# Patient Record
Sex: FEMALE | Race: WHITE | NOT HISPANIC OR LATINO | Employment: UNEMPLOYED | URBAN - METROPOLITAN AREA
[De-identification: names, ages, dates, MRNs, and addresses within clinical notes are randomized per-mention and may not be internally consistent; named-entity substitution may affect disease eponyms.]

---

## 2023-05-16 LAB
CHLAMYDIA TRACH., AMPLIFIED: NEGATIVE
N. GONORRHEA, AMPLIFIED: NEGATIVE
URINE CULTURE: NO GROWTH

## 2023-09-12 RX ORDER — ACETAMINOPHEN 325 MG/1
TABLET ORAL
COMMUNITY
Start: 2022-04-07 | End: 2023-11-30 | Stop reason: WASHOUT

## 2023-09-12 RX ORDER — PYRIDOXINE HCL (VITAMIN B6) 25 MG
TABLET ORAL
COMMUNITY
End: 2023-10-30 | Stop reason: ALTCHOICE

## 2023-10-20 ENCOUNTER — TELEPHONE (OUTPATIENT)
Dept: OBSTETRICS AND GYNECOLOGY | Facility: CLINIC | Age: 23
End: 2023-10-20

## 2023-10-20 NOTE — TELEPHONE ENCOUNTER
Pt has missed multiple OB appts and said she wants info on what her next appt entails. She missed her glucose, but said that she wasn't aware she needed to do it. She is 30 weeks

## 2023-10-23 DIAGNOSIS — Z3A.30 30 WEEKS GESTATION OF PREGNANCY (HHS-HCC): Primary | ICD-10-CM

## 2023-10-24 PROBLEM — O99.019 ANEMIA AFFECTING PREGNANCY, ANTEPARTUM (HHS-HCC): Status: ACTIVE | Noted: 2023-10-24

## 2023-10-26 PROBLEM — K64.9 HEMORRHOIDS: Status: ACTIVE | Noted: 2023-10-26

## 2023-10-26 PROBLEM — O26.899 PELVIC PAIN IN PREGNANCY (HHS-HCC): Status: ACTIVE | Noted: 2023-10-26

## 2023-10-26 PROBLEM — O21.9 NAUSEA AND VOMITING IN PREGNANCY (HHS-HCC): Status: ACTIVE | Noted: 2023-10-26

## 2023-10-26 PROBLEM — R10.2 PELVIC PAIN IN PREGNANCY (HHS-HCC): Status: ACTIVE | Noted: 2023-10-26

## 2023-10-26 PROBLEM — D64.9 ANEMIA: Status: ACTIVE | Noted: 2023-10-26

## 2023-10-26 PROBLEM — N94.89 UTERINE CRAMPING: Status: ACTIVE | Noted: 2023-10-26

## 2023-10-26 PROBLEM — N91.2 AMENORRHEA: Status: ACTIVE | Noted: 2023-10-26

## 2023-10-30 ENCOUNTER — ROUTINE PRENATAL (OUTPATIENT)
Dept: OBSTETRICS AND GYNECOLOGY | Facility: CLINIC | Age: 23
End: 2023-10-30
Payer: MEDICAID

## 2023-10-30 VITALS — SYSTOLIC BLOOD PRESSURE: 118 MMHG | WEIGHT: 177 LBS | DIASTOLIC BLOOD PRESSURE: 78 MMHG | BODY MASS INDEX: 29.45 KG/M2

## 2023-10-30 DIAGNOSIS — Z34.83 NORMAL PREGNANCY IN MULTIGRAVIDA IN THIRD TRIMESTER (HHS-HCC): ICD-10-CM

## 2023-10-30 DIAGNOSIS — Z3A.31 31 WEEKS GESTATION OF PREGNANCY (HHS-HCC): Primary | ICD-10-CM

## 2023-10-30 PROCEDURE — 99213 OFFICE O/P EST LOW 20 MIN: CPT | Performed by: ADVANCED PRACTICE MIDWIFE

## 2023-10-30 NOTE — PROGRESS NOTES
Return OB visit    S: Myrtle Colon is a 22 y.o.  at 31w1d with a working estimated date of delivery of 2023, by Ultrasound who presents for a routine prenatal visit. She denies vaginal bleeding, abdominal pain, leakage of fluid.     Concerns today: Patient has no concerns.     Kathy Patterson MA    O: See prenatal flow sheet    A/P:  -Labs for CBC/GCT discussed, pt was unable to complete prior to today's visit but plans to complete with next visit. Denies hx GDM with pregnancies. Taking PNV.  -Discussed vaccines in pregnancy including flu, covid, rsv, and tdap; pt declines vaccines.  -Reviewed warning signs and when to call midwife  -Plans to follow up in 2 weeks for a routine prenatal visit    Review labor preferences and provide form for completion.

## 2023-10-31 PROBLEM — N94.89 UTERINE CRAMPING: Status: RESOLVED | Noted: 2023-10-26 | Resolved: 2023-10-31

## 2023-10-31 PROBLEM — N91.2 AMENORRHEA: Status: RESOLVED | Noted: 2023-10-26 | Resolved: 2023-10-31

## 2023-11-03 NOTE — TELEPHONE ENCOUNTER
Patient calling back regarding the appointments that she has missed and wanted to know if she could come in and do her glucose test.

## 2023-11-15 ENCOUNTER — LAB (OUTPATIENT)
Dept: LAB | Facility: LAB | Age: 23
End: 2023-11-15
Payer: MEDICAID

## 2023-11-15 ENCOUNTER — APPOINTMENT (OUTPATIENT)
Dept: OBSTETRICS AND GYNECOLOGY | Facility: CLINIC | Age: 23
End: 2023-11-15
Payer: MEDICAID

## 2023-11-15 DIAGNOSIS — Z3A.30 30 WEEKS GESTATION OF PREGNANCY (HHS-HCC): ICD-10-CM

## 2023-11-15 LAB
ABO GROUP (TYPE) IN BLOOD: NORMAL
ANTIBODY SCREEN: NORMAL
ERYTHROCYTE [DISTWIDTH] IN BLOOD BY AUTOMATED COUNT: 12.5 % (ref 11.5–14.5)
GLUCOSE 1H P 50 G GLC PO SERPL-MCNC: 90 MG/DL
HBV SURFACE AG SERPL QL IA: NONREACTIVE
HCT VFR BLD AUTO: 35.4 % (ref 36–46)
HCV AB SER QL: NONREACTIVE
HGB BLD-MCNC: 11.6 G/DL (ref 12–16)
MCH RBC QN AUTO: 29.2 PG (ref 26–34)
MCHC RBC AUTO-ENTMCNC: 32.8 G/DL (ref 32–36)
MCV RBC AUTO: 89 FL (ref 80–100)
NRBC BLD-RTO: 0 /100 WBCS (ref 0–0)
PLATELET # BLD AUTO: 184 X10*3/UL (ref 150–450)
RBC # BLD AUTO: 3.97 X10*6/UL (ref 4–5.2)
RH FACTOR (ANTIGEN D): NORMAL
WBC # BLD AUTO: 7.6 X10*3/UL (ref 4.4–11.3)

## 2023-11-15 PROCEDURE — 87340 HEPATITIS B SURFACE AG IA: CPT

## 2023-11-15 PROCEDURE — 86901 BLOOD TYPING SEROLOGIC RH(D): CPT

## 2023-11-15 PROCEDURE — 86803 HEPATITIS C AB TEST: CPT

## 2023-11-15 PROCEDURE — 86780 TREPONEMA PALLIDUM: CPT

## 2023-11-15 PROCEDURE — 86850 RBC ANTIBODY SCREEN: CPT

## 2023-11-15 PROCEDURE — 86317 IMMUNOASSAY INFECTIOUS AGENT: CPT

## 2023-11-15 PROCEDURE — 85027 COMPLETE CBC AUTOMATED: CPT

## 2023-11-15 PROCEDURE — 86900 BLOOD TYPING SEROLOGIC ABO: CPT

## 2023-11-15 PROCEDURE — 82947 ASSAY GLUCOSE BLOOD QUANT: CPT

## 2023-11-15 PROCEDURE — 36415 COLL VENOUS BLD VENIPUNCTURE: CPT

## 2023-11-16 LAB
REFLEX ADDED, ANEMIA PANEL: NORMAL
RUBV IGG SERPL IA-ACNC: 3.7 IA
RUBV IGG SERPL QL IA: POSITIVE
T PALLIDUM AB SER QL: NONREACTIVE

## 2023-11-30 ENCOUNTER — ROUTINE PRENATAL (OUTPATIENT)
Dept: OBSTETRICS AND GYNECOLOGY | Facility: CLINIC | Age: 23
End: 2023-11-30

## 2023-11-30 VITALS — SYSTOLIC BLOOD PRESSURE: 130 MMHG | WEIGHT: 181.4 LBS | BODY MASS INDEX: 30.19 KG/M2 | DIASTOLIC BLOOD PRESSURE: 78 MMHG

## 2023-11-30 DIAGNOSIS — O99.019 ANEMIA AFFECTING PREGNANCY, ANTEPARTUM (HHS-HCC): ICD-10-CM

## 2023-11-30 DIAGNOSIS — R10.2 PELVIC PAIN IN PREGNANCY (HHS-HCC): ICD-10-CM

## 2023-11-30 DIAGNOSIS — O21.9 NAUSEA AND VOMITING IN PREGNANCY (HHS-HCC): ICD-10-CM

## 2023-11-30 DIAGNOSIS — O26.899 PELVIC PAIN IN PREGNANCY (HHS-HCC): ICD-10-CM

## 2023-11-30 PROCEDURE — 0501F PRENATAL FLOW SHEET: CPT | Performed by: ADVANCED PRACTICE MIDWIFE

## 2023-11-30 NOTE — PROGRESS NOTES
Subjective     Myrtle Colon is a 23 y.o.  at 35w4d with a working estimated date of delivery of 2023, by Ultrasound who presents for a routine prenatal visit. Endorses good fetal movement, denies vaginal bleeding, leakage of fluid, or regular contractions.    No OB concerns    Her pregnancy is complicated by:  Pregnancy Problems (from 05/15/23 to present)       Problem Noted Resolved    Pelvic pain in pregnancy 10/26/2023 by Wagner Ronaldo No    Priority:  Medium      Nausea and vomiting in pregnancy 10/26/2023 by Wagner Higgins No    Priority:  Medium      Anemia affecting pregnancy, antepartum 10/24/2023 by MIGUEL ÁNGEL Chin No    Priority:  Medium               Objective   Physical Exam:   Weight: 82.3 kg (181 lb 6.4 oz)  TW.1 kg (24 lb 6.4 oz)  Expected Total Weight Gain: 7 kg (15 lb)-11.5 kg (25 lb)   Pregravid BMI: 26.13  BP: 130/78  Fetal Heart Rate: 125 Fundal Height (cm): 35 cm             Postpartum Depression: Not on file        Prenatal Labs  Lab Results   Component Value Date    HGB 11.6 (L) 11/15/2023    HCT 35.4 (L) 11/15/2023     11/15/2023    ABO A 11/15/2023    LABRH POS 11/15/2023    NEISSGONOAMP NEGATIVE 05/15/2023    CHLAMTRACAMP NEGATIVE 05/15/2023    SYPHT Nonreactive 11/15/2023    HEPBSAG Nonreactive 11/15/2023    HIV1X2 NONREACTIVE 2021    URINECULTURE NO GROWTH 05/15/2023     Lab Results   Component Value Date    GLUC1P 90 11/15/2023       Assessment/Plan   23 y.o.  at 35w4d  Continue prenatal vitamins  GBS cx at next visit    Reviewed s/sx of PTL, warning signs, fetal movement counts, and when to call provider    Follow up in 2 week(s) for a routine prenatal visit or sooner as needed.    MIGUEL ÁNGEL Steen

## 2023-12-11 ENCOUNTER — ROUTINE PRENATAL (OUTPATIENT)
Dept: OBSTETRICS AND GYNECOLOGY | Facility: CLINIC | Age: 23
End: 2023-12-11
Payer: MEDICAID

## 2023-12-11 VITALS — DIASTOLIC BLOOD PRESSURE: 74 MMHG | BODY MASS INDEX: 30.12 KG/M2 | WEIGHT: 181 LBS | SYSTOLIC BLOOD PRESSURE: 116 MMHG

## 2023-12-11 DIAGNOSIS — Z3A.37 37 WEEKS GESTATION OF PREGNANCY (HHS-HCC): ICD-10-CM

## 2023-12-11 DIAGNOSIS — O21.9 NAUSEA AND VOMITING IN PREGNANCY (HHS-HCC): ICD-10-CM

## 2023-12-11 DIAGNOSIS — O26.899 PELVIC PAIN IN PREGNANCY (HHS-HCC): ICD-10-CM

## 2023-12-11 DIAGNOSIS — O99.019 ANEMIA AFFECTING PREGNANCY, ANTEPARTUM (HHS-HCC): ICD-10-CM

## 2023-12-11 DIAGNOSIS — R10.2 PELVIC PAIN IN PREGNANCY (HHS-HCC): ICD-10-CM

## 2023-12-11 PROCEDURE — 87081 CULTURE SCREEN ONLY: CPT

## 2023-12-11 PROCEDURE — 99213 OFFICE O/P EST LOW 20 MIN: CPT | Performed by: ADVANCED PRACTICE MIDWIFE

## 2023-12-11 NOTE — PROGRESS NOTES
"Subjective     Myrtle Colon is a 23 y.o.  at 37w1d with a working estimated date of delivery of 2023, by Ultrasound who presents for a routine prenatal visit. Endorses good fetal movement, denies vaginal bleeding, leakage of fluid, or regular contractions.    No OB concerns today.    Her pregnancy is complicated by:  Pregnancy Problems (from 05/15/23 to present)       Problem Noted Resolved    Pelvic pain in pregnancy 10/26/2023 by Wagner Ronaldo No    Priority:  Medium      Nausea and vomiting in pregnancy 10/26/2023 by Wagner Higgins No    Priority:  Medium      Anemia affecting pregnancy, antepartum 10/24/2023 by MIGUEL ÁNGEL Chin No    Priority:  Medium         Objective   Physical Exam:   Weight: 82.1 kg (181 lb)  TWG: 10.9 kg (24 lb)  Expected Total Weight Gain: 7 kg (15 lb)-11.5 kg (25 lb)   Pregravid BMI: 26.13  BP: 116/74  Fetal Heart Rate: 115 Fundal Height (cm): 38 cm    Dilation: 1 Effacement (%): 70 Fetal Station: -2 (ballotable)    Postpartum Depression: Not on file        Prenatal Labs  Lab Results   Component Value Date    HGB 11.6 (L) 11/15/2023    HCT 35.4 (L) 11/15/2023     11/15/2023    ABO A 11/15/2023    LABRH POS 11/15/2023    NEISSGONOAMP NEGATIVE 05/15/2023    CHLAMTRACAMP NEGATIVE 05/15/2023    SYPHT Nonreactive 11/15/2023    HEPBSAG Nonreactive 11/15/2023    HIV1X2 NONREACTIVE 2021    URINECULTURE NO GROWTH 05/15/2023     Lab Results   Component Value Date    GLUC1P 90 11/15/2023     No results found for: \"GRPBSTREP\"     Assessment/Plan   23 y.o.  at 37w1d  Continue prenatal vitamins  GBS collected, would prefer to avoid PCN. Explained the risk to  if transmitted during labor, including meningitis, sepsis, even death.  Encouraged pt to look into GBS infection in newborns before declining.  Labor consent reviewed and signed     Follow up in 1 week(s) for a routine prenatal visit or sooner as needed.    MIGUEL ÁNGEL Steen  "

## 2023-12-14 LAB — GP B STREP GENITAL QL CULT: NORMAL

## 2023-12-20 ENCOUNTER — APPOINTMENT (OUTPATIENT)
Dept: OBSTETRICS AND GYNECOLOGY | Facility: CLINIC | Age: 23
End: 2023-12-20
Payer: MEDICAID

## 2023-12-27 ENCOUNTER — ROUTINE PRENATAL (OUTPATIENT)
Dept: OBSTETRICS AND GYNECOLOGY | Facility: CLINIC | Age: 23
End: 2023-12-27
Payer: MEDICAID

## 2023-12-27 ENCOUNTER — APPOINTMENT (OUTPATIENT)
Dept: OBSTETRICS AND GYNECOLOGY | Facility: CLINIC | Age: 23
End: 2023-12-27
Payer: MEDICAID

## 2023-12-27 VITALS — SYSTOLIC BLOOD PRESSURE: 120 MMHG | WEIGHT: 183.6 LBS | BODY MASS INDEX: 30.55 KG/M2 | DIASTOLIC BLOOD PRESSURE: 80 MMHG

## 2023-12-27 DIAGNOSIS — Z3A.39 39 WEEKS GESTATION OF PREGNANCY (HHS-HCC): ICD-10-CM

## 2023-12-27 DIAGNOSIS — O99.019 ANEMIA AFFECTING PREGNANCY, ANTEPARTUM (HHS-HCC): ICD-10-CM

## 2023-12-27 DIAGNOSIS — O21.9 NAUSEA AND VOMITING IN PREGNANCY (HHS-HCC): ICD-10-CM

## 2023-12-27 DIAGNOSIS — R10.2 PELVIC PAIN IN PREGNANCY (HHS-HCC): ICD-10-CM

## 2023-12-27 DIAGNOSIS — O26.899 PELVIC PAIN IN PREGNANCY (HHS-HCC): ICD-10-CM

## 2023-12-27 LAB — HIV 1+2 AB+HIV1 P24 AG SERPL QL IA: NONREACTIVE

## 2023-12-27 PROCEDURE — 99213 OFFICE O/P EST LOW 20 MIN: CPT

## 2023-12-27 PROCEDURE — 87389 HIV-1 AG W/HIV-1&-2 AB AG IA: CPT

## 2023-12-27 PROCEDURE — 36415 COLL VENOUS BLD VENIPUNCTURE: CPT

## 2023-12-27 NOTE — PROGRESS NOTES
Bloody show for 3 days and more contractions, not strong enough to time  Pt declined being checked for dilation today 120/80    Subjective     Myrtle Colon is a 23 y.o.  at 39w3d with a working estimated date of delivery of 2023, by Ultrasound who presents for a routine prenatal visit. She denies leakage of fluid, decreased fetal movements, or regular contractions, though notes some brown tinged bloody discharge over the last three days.     HIV omitted in error from routine prenatal labs. Will collect today.     Discussed recommended vaccines in pregnancy and patient declines all vaccines.     Patient desires NCB and HBC. Patient desires to await spontaneous labor.     Her pregnancy is complicated by:  Medical Problems       Problem List       Anemia affecting pregnancy, antepartum    Pelvic pain in pregnancy    Nausea and vomiting in pregnancy    Hemorrhoids          Objective   Weight: 83.3 kg (183 lb 9.6 oz)  TW.1 kg (26 lb 9.6 oz)  Pregravid BMI: 26.13    BP: 120/80          Prenatal Labs:  Recent Results (from the past 4032 hour(s))   Hepatitis C Antibody    Collection Time: 11/15/23 10:19 AM   Result Value Ref Range    Hepatitis C AB Nonreactive Nonreactive   Hepatitis B Surface Antigen    Collection Time: 11/15/23 10:19 AM   Result Value Ref Range    Hepatitis B Surface AG Nonreactive Nonreactive   Rubella Antibody, IgG    Collection Time: 11/15/23 10:19 AM   Result Value Ref Range    Rubella, IgG Positive Negative    Rubella, IgG Index 3.7 <=0.7 IA IA   Syphilis Screen with Reflex    Collection Time: 11/15/23 10:19 AM   Result Value Ref Range    Syphilis Total Ab Nonreactive Nonreactive   Type And Screen    Collection Time: 11/15/23 10:19 AM   Result Value Ref Range    ABO TYPE A     Rh TYPE POS     ANTIBODY SCREEN NEG    Glucose, 1 Hour Screen, Pregnancy    Collection Time: 11/15/23 10:19 AM   Result Value Ref Range    Glucose, 1 Hr Screen, Preg 90 <135 mg/dL   CBC    Collection  Time: 11/15/23 10:19 AM   Result Value Ref Range    WBC 7.6 4.4 - 11.3 x10*3/uL    nRBC 0.0 0.0 - 0.0 /100 WBCs    RBC 3.97 (L) 4.00 - 5.20 x10*6/uL    Hemoglobin 11.6 (L) 12.0 - 16.0 g/dL    Hematocrit 35.4 (L) 36.0 - 46.0 %    MCV 89 80 - 100 fL    MCH 29.2 26.0 - 34.0 pg    MCHC 32.8 32.0 - 36.0 g/dL    RDW 12.5 11.5 - 14.5 %    Platelets 184 150 - 450 x10*3/uL   CBC Anemia Panel with Reflex, Pregnancy    Collection Time: 11/15/23 10:19 AM   Result Value Ref Range    Reflex added, Anemia panel     Group B Streptococcus (GBS) Prenatal Screen, Culture    Collection Time: 12/11/23  2:24 PM    Specimen: Vaginal/Rectal; Swab   Result Value Ref Range    Group B Strep Screen No Group B Streptococcus (GBS) isolated         Assessment/Plan     HIV collected today as it was previously omitted in error  Reviewed s/sx of labor, warning signs, fetal movement counts, and when to call provider  Follow up in 1 week for a routine prenatal visit.    Criss Tran, CHEVY-LETY

## 2024-01-03 ENCOUNTER — ROUTINE PRENATAL (OUTPATIENT)
Dept: OBSTETRICS AND GYNECOLOGY | Facility: CLINIC | Age: 24
End: 2024-01-03
Payer: MEDICAID

## 2024-01-03 VITALS — DIASTOLIC BLOOD PRESSURE: 86 MMHG | WEIGHT: 185 LBS | SYSTOLIC BLOOD PRESSURE: 116 MMHG | BODY MASS INDEX: 30.79 KG/M2

## 2024-01-03 DIAGNOSIS — Z34.83 NORMAL PREGNANCY IN MULTIGRAVIDA IN THIRD TRIMESTER (HHS-HCC): ICD-10-CM

## 2024-01-03 DIAGNOSIS — Z3A.40 40 WEEKS GESTATION OF PREGNANCY (HHS-HCC): Primary | ICD-10-CM

## 2024-01-03 PROCEDURE — 99213 OFFICE O/P EST LOW 20 MIN: CPT | Performed by: ADVANCED PRACTICE MIDWIFE

## 2024-01-03 NOTE — PROGRESS NOTES
Return OB visit    S: Myrtle Colon is a 23 y.o.  at 40w3d with a working estimated date of delivery of 2023, by Ultrasound who presents for a routine prenatal visit. She denies vaginal bleeding, abdominal pain, leakage of fluid.     Concerns today: Patient has no concerns. Had one night of contractions that have stopped.    Kathy Patterson MA    O: See prenatal flow sheet    A/P:  Advised at 41 weeks to have BPP twice weekly, pt to call now to schedule  Discussed and desires continued expectant management  Reviewed warning signs and when to call midwife  Follow up in 1 weeks for a routine prenatal visit

## 2024-01-08 ENCOUNTER — TELEPHONE (OUTPATIENT)
Dept: OBSTETRICS AND GYNECOLOGY | Facility: HOSPITAL | Age: 24
End: 2024-01-08

## 2024-01-08 ENCOUNTER — TELEPHONE (OUTPATIENT)
Dept: OBSTETRICS AND GYNECOLOGY | Facility: CLINIC | Age: 24
End: 2024-01-08

## 2024-01-08 NOTE — TELEPHONE ENCOUNTER
41.1 wk ob called ob line c/o irregular contractions since last night, becoming more painful having to breath through them.  Denies LOF but has been having brown spotting for the last week.  + FM.  Patient had consistent contractions every 6 minutes 2 nights ago but they subsided, yesterday they were very irregular as well.  Patient is very concerned with the sharp abdominal pain, below her belly button.  At her last ob follow up she was 2cm dilated.      Patient advised to come in today to see a CNM for ob follow up, since she is 41.1 wks without a scheduled appointment.  Patient advised that she will see Madelaine and may potentially get an NST.  Patient agreed    Message forwarded to Madelaine making her aware

## 2024-01-08 NOTE — TELEPHONE ENCOUNTER
"ITelephone call to patient:  I recommended patient come to the hospital and consider being delivered. Patient declined recommendations stating \" I am just overthinking this, the baby is probably posterior\".  Previous pregnancies both 10 days late.  Desires low intervention and to utilize the birth center. Reports + fm and irregular ctx, denies bleeding or lof.  Offered an appointment in the office patient declined. Also has not scheduled 2 x weekly monitoring for BPP and NST.  "

## 2024-01-09 ENCOUNTER — ROUTINE PRENATAL (OUTPATIENT)
Dept: OBSTETRICS AND GYNECOLOGY | Facility: CLINIC | Age: 24
End: 2024-01-09
Payer: MEDICAID

## 2024-01-09 ENCOUNTER — HOSPITAL ENCOUNTER (INPATIENT)
Facility: HOSPITAL | Age: 24
LOS: 1 days | Discharge: HOME | End: 2024-01-10
Attending: OBSTETRICS & GYNECOLOGY | Admitting: ADVANCED PRACTICE MIDWIFE
Payer: MEDICAID

## 2024-01-09 VITALS
BODY MASS INDEX: 30.66 KG/M2 | DIASTOLIC BLOOD PRESSURE: 82 MMHG | SYSTOLIC BLOOD PRESSURE: 128 MMHG | WEIGHT: 184.25 LBS

## 2024-01-09 DIAGNOSIS — K59.00 CONSTIPATION, UNSPECIFIED CONSTIPATION TYPE: Primary | ICD-10-CM

## 2024-01-09 DIAGNOSIS — R52 POSTPARTUM PAIN (HHS-HCC): ICD-10-CM

## 2024-01-09 DIAGNOSIS — O48.0 41 WEEKS GESTATION OF PREGNANCY (HHS-HCC): Primary | ICD-10-CM

## 2024-01-09 DIAGNOSIS — Z36.89 NST (NON-STRESS TEST) REACTIVE (HHS-HCC): ICD-10-CM

## 2024-01-09 DIAGNOSIS — Z3A.41 41 WEEKS GESTATION OF PREGNANCY (HHS-HCC): Primary | ICD-10-CM

## 2024-01-09 PROBLEM — O26.899 PELVIC PAIN IN PREGNANCY (HHS-HCC): Status: RESOLVED | Noted: 2023-10-26 | Resolved: 2024-01-09

## 2024-01-09 PROBLEM — K64.9 HEMORRHOIDS: Status: RESOLVED | Noted: 2023-10-26 | Resolved: 2024-01-09

## 2024-01-09 PROBLEM — R10.2 PELVIC PAIN IN PREGNANCY (HHS-HCC): Status: RESOLVED | Noted: 2023-10-26 | Resolved: 2024-01-09

## 2024-01-09 PROBLEM — O21.9 NAUSEA AND VOMITING IN PREGNANCY (HHS-HCC): Status: RESOLVED | Noted: 2023-10-26 | Resolved: 2024-01-09

## 2024-01-09 PROBLEM — O99.019 ANEMIA AFFECTING PREGNANCY, ANTEPARTUM (HHS-HCC): Status: RESOLVED | Noted: 2023-10-24 | Resolved: 2024-01-09

## 2024-01-09 LAB
ABO GROUP (TYPE) IN BLOOD: NORMAL
ANTIBODY SCREEN: NORMAL
ERYTHROCYTE [DISTWIDTH] IN BLOOD BY AUTOMATED COUNT: 13.1 % (ref 11.5–14.5)
HCT VFR BLD AUTO: 36.7 % (ref 36–46)
HGB BLD-MCNC: 11.7 G/DL (ref 12–16)
HOLD SPECIMEN: NORMAL
MCH RBC QN AUTO: 28.3 PG (ref 26–34)
MCHC RBC AUTO-ENTMCNC: 31.9 G/DL (ref 32–36)
MCV RBC AUTO: 89 FL (ref 80–100)
NRBC BLD-RTO: 0 /100 WBCS (ref 0–0)
PLATELET # BLD AUTO: 198 X10*3/UL (ref 150–450)
RBC # BLD AUTO: 4.14 X10*6/UL (ref 4–5.2)
RH FACTOR (ANTIGEN D): NORMAL
WBC # BLD AUTO: 16.7 X10*3/UL (ref 4.4–11.3)

## 2024-01-09 PROCEDURE — 86901 BLOOD TYPING SEROLOGIC RH(D): CPT | Performed by: ADVANCED PRACTICE MIDWIFE

## 2024-01-09 PROCEDURE — 99213 OFFICE O/P EST LOW 20 MIN: CPT | Performed by: ADVANCED PRACTICE MIDWIFE

## 2024-01-09 PROCEDURE — 7210000002 HC LABOR PER HOUR

## 2024-01-09 PROCEDURE — 59409 OBSTETRICAL CARE: CPT | Performed by: ADVANCED PRACTICE MIDWIFE

## 2024-01-09 PROCEDURE — 1120000001 HC OB PRIVATE ROOM DAILY

## 2024-01-09 PROCEDURE — 59050 FETAL MONITOR W/REPORT: CPT

## 2024-01-09 PROCEDURE — 2500000001 HC RX 250 WO HCPCS SELF ADMINISTERED DRUGS (ALT 637 FOR MEDICARE OP): Performed by: ADVANCED PRACTICE MIDWIFE

## 2024-01-09 PROCEDURE — 59025 FETAL NON-STRESS TEST: CPT | Performed by: ADVANCED PRACTICE MIDWIFE

## 2024-01-09 PROCEDURE — 7100000016 HC LABOR RECOVERY PER HOUR

## 2024-01-09 PROCEDURE — 86780 TREPONEMA PALLIDUM: CPT | Mod: STJLAB | Performed by: ADVANCED PRACTICE MIDWIFE

## 2024-01-09 PROCEDURE — 85027 COMPLETE CBC AUTOMATED: CPT | Performed by: ADVANCED PRACTICE MIDWIFE

## 2024-01-09 PROCEDURE — 36415 COLL VENOUS BLD VENIPUNCTURE: CPT | Performed by: ADVANCED PRACTICE MIDWIFE

## 2024-01-09 RX ORDER — LIDOCAINE 560 MG/1
1 PATCH PERCUTANEOUS; TOPICAL; TRANSDERMAL
Status: DISCONTINUED | OUTPATIENT
Start: 2024-01-09 | End: 2024-01-10 | Stop reason: HOSPADM

## 2024-01-09 RX ORDER — TRANEXAMIC ACID 100 MG/ML
1000 INJECTION, SOLUTION INTRAVENOUS ONCE AS NEEDED
Status: DISCONTINUED | OUTPATIENT
Start: 2024-01-09 | End: 2024-01-10 | Stop reason: HOSPADM

## 2024-01-09 RX ORDER — CARBOPROST TROMETHAMINE 250 UG/ML
250 INJECTION, SOLUTION INTRAMUSCULAR ONCE AS NEEDED
Status: DISCONTINUED | OUTPATIENT
Start: 2024-01-09 | End: 2024-01-10 | Stop reason: HOSPADM

## 2024-01-09 RX ORDER — IBUPROFEN 600 MG/1
600 TABLET ORAL EVERY 6 HOURS PRN
Qty: 120 TABLET | Refills: 0 | Status: SHIPPED | OUTPATIENT
Start: 2024-01-09

## 2024-01-09 RX ORDER — NIFEDIPINE 10 MG/1
10 CAPSULE ORAL ONCE AS NEEDED
Status: DISCONTINUED | OUTPATIENT
Start: 2024-01-09 | End: 2024-01-10 | Stop reason: HOSPADM

## 2024-01-09 RX ORDER — NIFEDIPINE 10 MG/1
10 CAPSULE ORAL ONCE AS NEEDED
Status: DISCONTINUED | OUTPATIENT
Start: 2024-01-09 | End: 2024-01-09

## 2024-01-09 RX ORDER — LABETALOL HYDROCHLORIDE 5 MG/ML
20 INJECTION, SOLUTION INTRAVENOUS ONCE AS NEEDED
Status: DISCONTINUED | OUTPATIENT
Start: 2024-01-09 | End: 2024-01-09

## 2024-01-09 RX ORDER — SIMETHICONE 80 MG
80 TABLET,CHEWABLE ORAL 4 TIMES DAILY PRN
Status: DISCONTINUED | OUTPATIENT
Start: 2024-01-09 | End: 2024-01-10 | Stop reason: HOSPADM

## 2024-01-09 RX ORDER — OXYTOCIN 10 [USP'U]/ML
10 INJECTION, SOLUTION INTRAMUSCULAR; INTRAVENOUS ONCE AS NEEDED
Status: DISCONTINUED | OUTPATIENT
Start: 2024-01-09 | End: 2024-01-09

## 2024-01-09 RX ORDER — MAGNESIUM HYDROXIDE 2400 MG/10ML
10 SUSPENSION ORAL
Status: DISCONTINUED | OUTPATIENT
Start: 2024-01-09 | End: 2024-01-10 | Stop reason: HOSPADM

## 2024-01-09 RX ORDER — MISOPROSTOL 200 UG/1
800 TABLET ORAL ONCE AS NEEDED
Status: DISCONTINUED | OUTPATIENT
Start: 2024-01-09 | End: 2024-01-09

## 2024-01-09 RX ORDER — LOPERAMIDE HYDROCHLORIDE 2 MG/1
4 CAPSULE ORAL EVERY 2 HOUR PRN
Status: DISCONTINUED | OUTPATIENT
Start: 2024-01-09 | End: 2024-01-10 | Stop reason: HOSPADM

## 2024-01-09 RX ORDER — LOPERAMIDE HYDROCHLORIDE 2 MG/1
4 CAPSULE ORAL EVERY 2 HOUR PRN
Status: DISCONTINUED | OUTPATIENT
Start: 2024-01-09 | End: 2024-01-09

## 2024-01-09 RX ORDER — MINERAL OIL
OIL (ML) ORAL
Status: DISPENSED
Start: 2024-01-09 | End: 2024-01-10

## 2024-01-09 RX ORDER — MISOPROSTOL 200 UG/1
800 TABLET ORAL ONCE AS NEEDED
Status: DISCONTINUED | OUTPATIENT
Start: 2024-01-09 | End: 2024-01-10 | Stop reason: HOSPADM

## 2024-01-09 RX ORDER — OXYTOCIN/0.9 % SODIUM CHLORIDE 30/500 ML
PLASTIC BAG, INJECTION (ML) INTRAVENOUS
Status: DISCONTINUED
Start: 2024-01-09 | End: 2024-01-09 | Stop reason: WASHOUT

## 2024-01-09 RX ORDER — IBUPROFEN 600 MG/1
600 TABLET ORAL EVERY 6 HOURS SCHEDULED
Status: DISCONTINUED | OUTPATIENT
Start: 2024-01-09 | End: 2024-01-10 | Stop reason: HOSPADM

## 2024-01-09 RX ORDER — HYDRALAZINE HYDROCHLORIDE 20 MG/ML
5 INJECTION INTRAMUSCULAR; INTRAVENOUS ONCE AS NEEDED
Status: DISCONTINUED | OUTPATIENT
Start: 2024-01-09 | End: 2024-01-09

## 2024-01-09 RX ORDER — OXYTOCIN 10 [USP'U]/ML
10 INJECTION, SOLUTION INTRAMUSCULAR; INTRAVENOUS ONCE AS NEEDED
Status: DISCONTINUED | OUTPATIENT
Start: 2024-01-09 | End: 2024-01-10 | Stop reason: HOSPADM

## 2024-01-09 RX ORDER — METOCLOPRAMIDE 10 MG/1
10 TABLET ORAL EVERY 6 HOURS PRN
Status: DISCONTINUED | OUTPATIENT
Start: 2024-01-09 | End: 2024-01-09

## 2024-01-09 RX ORDER — CARBOPROST TROMETHAMINE 250 UG/ML
250 INJECTION, SOLUTION INTRAMUSCULAR ONCE AS NEEDED
Status: DISCONTINUED | OUTPATIENT
Start: 2024-01-09 | End: 2024-01-09

## 2024-01-09 RX ORDER — BISACODYL 10 MG/1
10 SUPPOSITORY RECTAL DAILY PRN
Status: DISCONTINUED | OUTPATIENT
Start: 2024-01-09 | End: 2024-01-10 | Stop reason: HOSPADM

## 2024-01-09 RX ORDER — METOCLOPRAMIDE HYDROCHLORIDE 5 MG/ML
10 INJECTION INTRAMUSCULAR; INTRAVENOUS EVERY 6 HOURS PRN
Status: DISCONTINUED | OUTPATIENT
Start: 2024-01-09 | End: 2024-01-09

## 2024-01-09 RX ORDER — TERBUTALINE SULFATE 1 MG/ML
0.25 INJECTION SUBCUTANEOUS ONCE AS NEEDED
Status: DISCONTINUED | OUTPATIENT
Start: 2024-01-09 | End: 2024-01-09

## 2024-01-09 RX ORDER — LABETALOL HYDROCHLORIDE 5 MG/ML
20 INJECTION, SOLUTION INTRAVENOUS ONCE AS NEEDED
Status: DISCONTINUED | OUTPATIENT
Start: 2024-01-09 | End: 2024-01-10 | Stop reason: HOSPADM

## 2024-01-09 RX ORDER — METHYLERGONOVINE MALEATE 0.2 MG/ML
0.2 INJECTION INTRAVENOUS ONCE AS NEEDED
Status: DISCONTINUED | OUTPATIENT
Start: 2024-01-09 | End: 2024-01-09

## 2024-01-09 RX ORDER — ACETAMINOPHEN 500 MG
1000 TABLET ORAL EVERY 6 HOURS PRN
COMMUNITY
Start: 2024-01-09

## 2024-01-09 RX ORDER — DIPHENHYDRAMINE HYDROCHLORIDE 50 MG/ML
25 INJECTION INTRAMUSCULAR; INTRAVENOUS EVERY 6 HOURS PRN
Status: DISCONTINUED | OUTPATIENT
Start: 2024-01-09 | End: 2024-01-10 | Stop reason: HOSPADM

## 2024-01-09 RX ORDER — ONDANSETRON 4 MG/1
4 TABLET, FILM COATED ORAL EVERY 6 HOURS PRN
Status: DISCONTINUED | OUTPATIENT
Start: 2024-01-09 | End: 2024-01-09

## 2024-01-09 RX ORDER — ACETAMINOPHEN 325 MG/1
975 TABLET ORAL EVERY 6 HOURS SCHEDULED
Status: DISCONTINUED | OUTPATIENT
Start: 2024-01-09 | End: 2024-01-10 | Stop reason: HOSPADM

## 2024-01-09 RX ORDER — HYDRALAZINE HYDROCHLORIDE 20 MG/ML
5 INJECTION INTRAMUSCULAR; INTRAVENOUS ONCE AS NEEDED
Status: DISCONTINUED | OUTPATIENT
Start: 2024-01-09 | End: 2024-01-10 | Stop reason: HOSPADM

## 2024-01-09 RX ORDER — ONDANSETRON HYDROCHLORIDE 2 MG/ML
4 INJECTION, SOLUTION INTRAVENOUS EVERY 6 HOURS PRN
Status: DISCONTINUED | OUTPATIENT
Start: 2024-01-09 | End: 2024-01-09

## 2024-01-09 RX ORDER — POLYETHYLENE GLYCOL 3350 17 G/17G
17 POWDER, FOR SOLUTION ORAL 2 TIMES DAILY PRN
Status: DISCONTINUED | OUTPATIENT
Start: 2024-01-09 | End: 2024-01-10 | Stop reason: HOSPADM

## 2024-01-09 RX ORDER — TRANEXAMIC ACID 100 MG/ML
1000 INJECTION, SOLUTION INTRAVENOUS ONCE AS NEEDED
Status: DISCONTINUED | OUTPATIENT
Start: 2024-01-09 | End: 2024-01-09

## 2024-01-09 RX ORDER — DIPHENHYDRAMINE HCL 25 MG
25 CAPSULE ORAL EVERY 6 HOURS PRN
Status: DISCONTINUED | OUTPATIENT
Start: 2024-01-09 | End: 2024-01-10 | Stop reason: HOSPADM

## 2024-01-09 RX ORDER — SODIUM CHLORIDE, SODIUM LACTATE, POTASSIUM CHLORIDE, CALCIUM CHLORIDE 600; 310; 30; 20 MG/100ML; MG/100ML; MG/100ML; MG/100ML
125 INJECTION, SOLUTION INTRAVENOUS CONTINUOUS
Status: DISCONTINUED | OUTPATIENT
Start: 2024-01-09 | End: 2024-01-09

## 2024-01-09 RX ORDER — DOCUSATE SODIUM 100 MG/1
100 CAPSULE, LIQUID FILLED ORAL 2 TIMES DAILY PRN
COMMUNITY
Start: 2024-01-09

## 2024-01-09 RX ORDER — OXYTOCIN/0.9 % SODIUM CHLORIDE 30/500 ML
60 PLASTIC BAG, INJECTION (ML) INTRAVENOUS
Status: DISCONTINUED | OUTPATIENT
Start: 2024-01-09 | End: 2024-01-09

## 2024-01-09 RX ORDER — LIDOCAINE HYDROCHLORIDE 10 MG/ML
30 INJECTION INFILTRATION; PERINEURAL ONCE AS NEEDED
Status: DISCONTINUED | OUTPATIENT
Start: 2024-01-09 | End: 2024-01-09

## 2024-01-09 RX ORDER — METHYLERGONOVINE MALEATE 0.2 MG/ML
0.2 INJECTION INTRAVENOUS ONCE AS NEEDED
Status: DISCONTINUED | OUTPATIENT
Start: 2024-01-09 | End: 2024-01-10 | Stop reason: HOSPADM

## 2024-01-09 RX ADMIN — ACETAMINOPHEN 975 MG: 325 TABLET ORAL at 18:57

## 2024-01-09 RX ADMIN — IBUPROFEN 600 MG: 600 TABLET, FILM COATED ORAL at 18:57

## 2024-01-09 ASSESSMENT — PAIN SCALES - GENERAL
PAINLEVEL_OUTOF10: 0 - NO PAIN
PAINLEVEL_OUTOF10: 3
PAINLEVEL_OUTOF10: 5 - MODERATE PAIN
PAINLEVEL_OUTOF10: 3
PAINLEVEL_OUTOF10: 6
PAINLEVEL_OUTOF10: 4
PAINLEVEL_OUTOF10: 4
PAINLEVEL_OUTOF10: 0 - NO PAIN

## 2024-01-09 ASSESSMENT — PAIN DESCRIPTION - DESCRIPTORS
DESCRIPTORS: CRAMPING

## 2024-01-09 ASSESSMENT — ENCOUNTER SYMPTOMS
HEMATOLOGIC/LYMPHATIC NEGATIVE: 0
EYES NEGATIVE: 0
NEUROLOGICAL NEGATIVE: 0
MUSCULOSKELETAL NEGATIVE: 0
CONSTITUTIONAL NEGATIVE: 0
GASTROINTESTINAL NEGATIVE: 0
ALLERGIC/IMMUNOLOGIC NEGATIVE: 0
CARDIOVASCULAR NEGATIVE: 0
RESPIRATORY NEGATIVE: 0
PSYCHIATRIC NEGATIVE: 0
ENDOCRINE NEGATIVE: 0

## 2024-01-09 ASSESSMENT — PAIN - FUNCTIONAL ASSESSMENT: PAIN_FUNCTIONAL_ASSESSMENT: 0-10

## 2024-01-09 NOTE — CARE PLAN
The patient's goals for the shift include NCB healthy baby    The clinical goals for the shift include NCB    Over the shift, the patient did not make progress toward the following goals. Barriers to progression include none. Recommendations to address these barriers include none.

## 2024-01-09 NOTE — H&P
2Obstetrical Admission History and Physical     Myrtle Colon is a 23 y.o.  at 41w2d. ELIS: 2023, by Ultrasound. Estimated fetal weight: 7#8. She has had prenatal care with [facility] .    Chief Complaint: Contractions    Assessment/Plan    IUP at 41w2d in active labor   Reassuring maternal and fetal status  Anticipate second stage and     Principal Problem:    Labor without complication      Pregnancy Problems (from 05/15/23 to present)       Problem Noted Resolved    Pelvic pain in pregnancy 10/26/2023 by Wagner Higgins No    Priority:  Medium      Nausea and vomiting in pregnancy 10/26/2023 by Wagner Higgins No    Priority:  Medium      Anemia affecting pregnancy, antepartum 10/24/2023 by CHEVY Chin-TAD No    Priority:  Medium            Options for delivery have been discussed with the patient and she elects a vaginal delivery.  Labor has been discussed in detail. The risks, benefits, complications, alternatives, expected outcomes, potential problems during recuperation and recovery, and the risks of not performing the procedure were discussed with the patient. The patient stated understanding that the risks of delivery include, but are not limited to: death; reaction to medications; injury to bowel, bladder, ureters, uterus, cervix, vagina, and other pelvic and abdominal structures, infection; blood loss and possible need for transfusion; and potential need for surgery, including hysterectomy. The risks of injury to the infant during delivery were also discussed. All questions were answered. There was concurrence with the planned procedure, and the patient wanted to proceed.    Admit to inpatient status. I anticipate that this patient will require a stay exceeding at least 2 midnights for delivery and postpartum care.  Active management of labor.  Management of pregnancy complications, as indicated.    Ayah Iraheta is here complaining of q3 min  contractions. Good fetal movement. C/O bloody show., Denies leaking of fluid.       States she was seen in outpatient office this morning and was in early labor     Obstetrical History   OB History    Para Term  AB Living   3 2 2 0 0 2   SAB IAB Ectopic Multiple Live Births   0 0 0 0 2      # Outcome Date GA Lbr Severino/2nd Weight Sex Delivery Anes PTL Lv   3 Current            2 Term 22 41w3d  3515 g M Vag-Spont EPI  BENI   1 Term 20 41w2d  3232 g F Vag-Spont EPI  BENI       Past Medical History  Past Medical History:   Diagnosis Date    Anemia     Hemorrhoids during pregnancy     Nausea and vomiting during pregnancy     Pelvic pain in pregnancy     Personal history of other complications of pregnancy, childbirth and the puerperium     History of pre-eclampsia        Past Surgical History   No past surgical history on file.    Social History  Social History     Tobacco Use    Smoking status: Never    Smokeless tobacco: Never   Substance Use Topics    Alcohol use: Not Currently     Substance and Sexual Activity   Drug Use Never       Allergies  Patient has no known allergies.     Medications  Medications Prior to Admission   Medication Sig Dispense Refill Last Dose    PNV135-iron-Lfolate-omega3-dha (Prenatal Plus DHA) 18 mg iron-800 mcg-290 mg combo pack, capsule and packet Take by mouth.   2024       Objective    Last Vitals  Temp Pulse Resp BP MAP O2 Sat   36.8 °C (98.2 °F) 85 17 131/81   98 %     Physical Examination  GENERAL: Examination reveals a well developed, well nourished, gravid female in no acute distress. She is alert and cooperative.  HEENT: PERRLA. External ears normal. Nose normal, no erythema or discharge. Mouth and throat clear.  NECK: supple, no significant adenopathy  LUNGS: clear to auscultation bilaterally  HEART: regular rate and rhythm, S1, S2 normal, no murmur, click, rub or gallop  BREASTS: breasts appear normal for pregnancy, no suspicious masses, no skin or nipple  changes or axillary nodes  The fetus is in a vertex presentation, determined by vaginal exam  Current Estimated Fetal Weight 7#8 established by vaginal exam and Leopold's maneuver  VAGINA: normal EGBUS--positive bloody show  CERVIX:  9.5/100/-1 with BBOW ; MEMBRANES are Intact  EXTREMITIES: no redness or tenderness in the calves or thighs, no edema  PSYCHOLOGICAL: awake and alert; oriented to person, place, and time    Lab Review  Labs not sent yet due to imminent delivery

## 2024-01-09 NOTE — L&D DELIVERY NOTE
OB Delivery Note  2024  Myrtle Colon  23 y.o.   Vaginal, Spontaneous        Gestational Age: 41w2d  /Para:   Quantitative Blood Loss: Admission to Discharge: 164 mL (2024  4:55 PM - 2024  6:55 PM)    Lia Colon [[id number]]      Labor Events     labor?: No  Sac identifier: Sac 1  Rupture date/time: 2024  Rupture type: Spontaneous  Fluid color: Meconium  Fluid odor: None  Labor type: Spontaneous Onset of Labor  Augmentation: None  Complications: None       Placenta    Placenta delivery date/time: 2024  Placenta removal: Spontaneous  Placenta appearance: Intact  Placenta disposition: discarded       Cord    Vessels: 3 vessels  Complications: None  Delayed cord clamping?: Yes  Cord blood disposition: Discarded  Gases sent?: No  Stem cell collection (by provider): No       Lacerations    Episiotomy: None  Perineal laceration: None  Other lacerations?: No  Repair suture: None       Anesthesia    Method: None       Operative Delivery    Forceps attempted?: No  Vacuum extractor attempted?: No       Shoulder Dystocia    Shoulder dystocia present?: No        Delivery    Time head delivered: 2024 17:36:00  Birth date/time: 2024 17:36:00  Delivery type: Vaginal, Spontaneous  Complications: None       Resuscitation    Method: Tactile stimulation       Apgars    Living status: Living  Apgar Component Scores:  1 min.:  5 min.:  10 min.:  15 min.:  20 min.:    Skin color:  1  1       Heart rate:  2  2       Reflex irritability:  2  2       Muscle tone:  2  2       Respiratory effort:  2  2       Total:  9  9       Apgars assigned by: ISHAAN NANCE       Delivery Providers    Delivering clinician: CHEVY Hubbard-LETY   Provider Role    Alberta Saini RN Delivery Nurse    Christie Connelly, RN Nursery Nurse    (No Documentation) Resident             CNM delivery note--Patient arrived at 9.5/100/-1 with BBOW; she is encouraged to push if she  feels urge; SROM with push for light meconium stained fluid and delivery of head soon thereafter; sheoulders delivered easily with  maternal effort and baby handed to kangaroo care; she had previously declined third stage pitocin in the absence of hemorrhage; placenta spontaneous and appears intact; fundus firm with massage; perineum, vagina and vulva intact; QBL 100cc; ice pack to perineum; in addition, patient declined  IV access, blood draw and all injections/meds for baby.    CHEVY Hubbard-LETY

## 2024-01-10 VITALS
DIASTOLIC BLOOD PRESSURE: 63 MMHG | RESPIRATION RATE: 17 BRPM | TEMPERATURE: 98.2 F | HEIGHT: 66 IN | OXYGEN SATURATION: 98 % | HEART RATE: 78 BPM | WEIGHT: 183.31 LBS | BODY MASS INDEX: 29.46 KG/M2 | SYSTOLIC BLOOD PRESSURE: 129 MMHG

## 2024-01-10 LAB — T PALLIDUM AB SER QL: NONREACTIVE

## 2024-01-10 PROCEDURE — 2500000001 HC RX 250 WO HCPCS SELF ADMINISTERED DRUGS (ALT 637 FOR MEDICARE OP): Performed by: ADVANCED PRACTICE MIDWIFE

## 2024-01-10 RX ADMIN — ACETAMINOPHEN 975 MG: 325 TABLET ORAL at 13:49

## 2024-01-10 RX ADMIN — ACETAMINOPHEN 975 MG: 325 TABLET ORAL at 01:02

## 2024-01-10 RX ADMIN — IBUPROFEN 600 MG: 600 TABLET, FILM COATED ORAL at 01:02

## 2024-01-10 RX ADMIN — ACETAMINOPHEN 975 MG: 325 TABLET ORAL at 08:04

## 2024-01-10 RX ADMIN — IBUPROFEN 600 MG: 600 TABLET, FILM COATED ORAL at 13:49

## 2024-01-10 RX ADMIN — IBUPROFEN 600 MG: 600 TABLET, FILM COATED ORAL at 08:04

## 2024-01-10 ASSESSMENT — PAIN SCALES - GENERAL
PAINLEVEL_OUTOF10: 3
PAINLEVEL_OUTOF10: 0 - NO PAIN
PAINLEVEL_OUTOF10: 2

## 2024-01-10 ASSESSMENT — PAIN DESCRIPTION - DESCRIPTORS: DESCRIPTORS: CRAMPING

## 2024-01-10 NOTE — CARE PLAN
The patient's goals for the shift include Bond with baby    The clinical goals for the shift include Remain stable      Problem: Vaginal Birth or  Section  Goal: Fetal and maternal status remain reassuring during the birth process  Outcome: Met  Flowsheets (Taken 1/10/2024 0626)  Fetal and maternal status remain reassuring during the birth process:   Monitor vital signs   Monitor fetal heart rate   Monitor uterine activity   Monitor labor progression (Vaginal delivery)  Goal: Prevention of malpresentation/labor dystocia through delivery  Outcome: Met  Flowsheets (Taken 1/10/2024 0626)  Prevention of malpresentation/labor dystocia through delivery: Positioning, turning, and/or use of birthing ball assistance  Goal: Demonstrates labor coping techniques through delivery  Outcome: Met  Flowsheets (Taken 1/10/2024 0626)  Demonstrates labor coping techniques through delivery:   Positioning, turning, and/or use of birthing ball assistance   Breathing/relaxation assistance  Goal: Minimal s/sx of HDP and BP<160/110  Outcome: Met  Flowsheets (Taken 1/10/2024 0626)  Minimal s/sx of HDP and BP <160/110:   Med administration/monitoring of effect   Monitor QBL and vital signs  Goal: No s/sx of infection through recovery  Outcome: Met  Flowsheets (Taken 1/10/2024 0626)  No s/sx of infection through recovery:   Hygiene practices/lavern-care   Monitor QBL and vital signs  Goal: No s/sx of hemorrhage through recovery  Outcome: Met  Flowsheets (Taken 1/10/2024 0626)  No s/sx of hemorrhage through recovery: Monitor QBL and vital signs     Problem: Postpartum  Goal: Experiences normal postpartum course  Outcome: Progressing  Flowsheets (Taken 1/10/2024 0626)  Experiences normal postpartum course:   Monitor maternal vital signs   Assess uterine involution   Med administration/monitoring of effect   Fundal and lochia asssessments w/fundal massage, bladder emptying  Goal: Appropriate maternal -  bonding  Outcome:  Progressing  Flowsheets (Taken 1/10/2024 0626)  Appropriate maternal- bonding:   Identify family support   24-hour rooming in  Goal: No s/sx infection  Outcome: Progressing  Flowsheets (Taken 1/10/2024 0626)  No s/sx of infection:   Hygiene practices/lavern-care   Monitor QBL and vital signs   Fundal + lochia assessments w/fundal massage, bladder emptying, intrauterine device when indicated  Goal: No s/sx of hemorrhage  Outcome: Progressing  Flowsheets (Taken 1/10/2024 0626)  No s/sx of hemorrhage:   Monitor QBL and vital signs   Fundal + lochia assessments w/fundal massage, bladder emptying, intrauterine device when indicated  Goal: Minimal s/sx of HDP and BP<160/110  Outcome: Progressing  Flowsheets (Taken 1/10/2024 0626)  Minimal s/sx of HDP and BP <160/110:   Med administration/monitoring of effect   Monitor QBL and vital signs     Problem: Pain - Adult  Goal: Verbalizes/displays adequate comfort level or baseline comfort level  Outcome: Progressing  Flowsheets (Taken 1/10/2024 0626)  Verbalizes/displays adequate comfort level or baseline comfort level:   Encourage patient to monitor pain and request assistance   Assess pain using appropriate pain scale   Administer analgesics based on type and severity of pain and evaluate response   Implement non-pharmacological measures as appropriate and evaluate response     Problem: Safety - Adult  Goal: Free from fall injury  Outcome: Progressing  Flowsheets (Taken 1/10/2024 0626)  Free from fall injury:   Instruct family/caregiver on patient safety   Based on caregiver fall risk screen, instruct family/caregiver to ask for assistance with transferring infant if caregiver noted to have fall risk factors     Problem: Discharge Planning  Goal: Discharge to home or other facility with appropriate resources  Outcome: Progressing  Flowsheets (Taken 1/10/2024 0626)  Discharge to home or other facility with appropriate resources: Identify barriers to discharge with patient  and caregiver

## 2024-01-10 NOTE — PROGRESS NOTES
CNM update--notified by RN that patient had blood draw at 1830 and when BP cuff replaced on her arm is was incorrectly placed resulting in an erroneous severe range BP which was not repeated; all others have been normotensive.

## 2024-01-10 NOTE — DISCHARGE SUMMARY
Discharge Summary    Admission Date: 1/9/2024  Discharge Date: 1/10/2024    Discharge Diagnosis  Labor without complication    Hospital Course  Delivery Date: 1/9/2024  5:36 PM   Delivery type: Vaginal, Spontaneous    GA at delivery: 41w2d  Outcome: Living   Anesthesia during delivery: None   Intrapartum complications: None   Feeding method: Breastfeeding Status: Yes     Procedures: none  Contraception at discharge: none  Discuss @ 6 wk PP visit     Discharge Meds     Your medication list        START taking these medications        Instructions Last Dose Given Next Dose Due   acetaminophen 500 mg tablet  Commonly known as: Tylenol Extra Strength      Take 2 tablets (1,000 mg) by mouth every 6 hours if needed for mild pain (1 - 3).       docusate sodium 100 mg capsule  Commonly known as: Colace      Take 1 capsule (100 mg) by mouth 2 times a day as needed for constipation.       ibuprofen 600 mg tablet      Take 1 tablet (600 mg) by mouth every 6 hours if needed for mild pain (1 - 3) or moderate pain (4 - 6).              CONTINUE taking these medications        Instructions Last Dose Given Next Dose Due   Prenatal Plus DHA 18 mg iron-800 mcg-290 mg combo pack, capsule and packet                     Where to Get Your Medications        These medications were sent to [id number] - [place] [id number] [facility]  [address] [address]      Phone: [phone]   ibuprofen 600 mg tablet       You can get these medications from any pharmacy    You don't need a prescription for these medications  acetaminophen 500 mg tablet  docusate sodium 100 mg capsule          Complications Requiring Follow-Up  none    Test Results Pending At Discharge  Pending Labs       Order Current Status    Syphilis Screen with Reflex In process            Outpatient Follow-Up  No future appointments.        MIGUEL ÁNGEL Steen

## 2024-01-10 NOTE — PROGRESS NOTES
"Postpartum Progress Note    Subjective    Pt doing well. Pain is well controlled with meds. Afebrile. No chills.  Scant/normal lochia. Voiding. Up and walking.  Eating regular diet. No N/V. Passing gas. No BM yet.  breast feeding without difficulty.  No Complaints    Objective    /65   Pulse 77   Temp 36.6 °C (97.9 °F) (Oral)   Resp 17   Ht 1.676 m (5' 6\")   Wt 83.2 kg (183 lb 5 oz)   SpO2 99%   Breastfeeding Yes   BMI 29.59 kg/m²    General: Examination reveals a well developed, well nourished, female, in no acute distress. She is alert and cooperative.  HEENT: Conjunctiva clear  Lungs: clear to auscultation bilaterally.  Cardiac: regular rate   Breasts: lactating, no erythema or tenderness, nipples normal.  Abdomen: Soft, non-distended  Incision: none.  Fundus: firm and 1 below umbilicus.  Perineum: well approximated.  Extremities: no redness or tenderness in the calves or thighs, no edema.  Psychological: awake and alert; oriented to person, place, and time.    Lab Results   Component Value Date    HGB 11.7 (L) 01/09/2024    HCT 36.7 01/09/2024       Assessment/Plan    23 y.o. postpartum day 1 s/p spontaneous vaginal delivery  Active Problems:  There are no active Hospital Problems.      Plan:  -Continue normal postpartum care  -Admission hgb 11.7  --> 214 EBL ; Continue to monitor for si/sx of anemia.   -Patient instructed on pelvic rest x6 weeks  -Post birth warning signs reviewed  -Patient reports that she has adequate help at home and access to infant supplies  -Patient instructed to continue PNV upon D/C for one year or until after she discontinues breastfeeding whichever is longer    -Rhogam if indicated prior to discharge    -Continue to encourage breast feeding  -Lactation consult as needed    DVT prophylaxis as indicated in orders  -SCDs  -Ambulation    Dispo: anticipate discharge on PPD# 1 per pt request , if continues to meet milestones. Plan for follow up in 4-6 weeks for postpartum " visit with OB provider.    MIGUEL ÁNGEL Alfred

## 2024-01-10 NOTE — PROGRESS NOTES
Subjective     Myrtle Colon is a 23 y.o.  at 41w2d with a working estimated date of delivery of 2023, by Ultrasound who presents for a routine prenatal visit. Endorses good fetal movement, denies vaginal bleeding, leakage of fluid, or regular contractions.    Her pregnancy is complicated by:  Pregnancy Problems (from 05/15/23 to present)       Problem Noted Resolved    Pelvic pain in pregnancy 10/26/2023 by Wagner Higgins 2024 by MIGUEL ÁNGEL Steen    Priority:  Medium      Nausea and vomiting in pregnancy 10/26/2023 by Wagner Higgins 2024 by MIGUEL ÁNGEL Steen    Priority:  Medium      Anemia affecting pregnancy, antepartum 10/24/2023 by MIGUEL ÁNGEL Chin 2024 by MIGUEL ÁNGEL Steen    Priority:  Medium           Objective   Physical Exam:   Weight: 83.6 kg (184 lb 4 oz)  TW.9 kg (26 lb 5 oz)  Expected Total Weight Gain: 7 kg (15 lb)-11.5 kg (25 lb)   Pregravid BMI: 25.35  BP: 128/82  Fetal Heart Rate: 120   Presentation: Vertex  Dilation: 5 Effacement (%): 80 Fetal Station: 0    Postpartum Depression: Not on file        Prenatal Labs  Lab Results   Component Value Date    HGB 11.7 (L) 2024    HCT 36.7 2024     2024    ABO A 2024    LABRH POS 2024    NEISSGONOAMP NEGATIVE 05/15/2023    CHLAMTRACAMP NEGATIVE 05/15/2023    SYPHT Nonreactive 11/15/2023    HEPBSAG Nonreactive 11/15/2023    HIV1X2 Nonreactive 2023    URINECULTURE NO GROWTH 05/15/2023     Lab Results   Component Value Date    GLUC1P 90 11/15/2023     Group B Strep Screen   Date Value Ref Range Status   2023 No Group B Streptococcus (GBS) isolated  Final        Assessment/Plan   23 y.o.  at 41w2d  Continue prenatal vitamins  GBS negative    Reviewed s/sx of labor, warning signs, fetal movement counts, and when to call provider    MIGUEL ÁNGEL Steen

## 2024-01-10 NOTE — HOSPITAL COURSE
1/9/24  1700: anterior lip on arrival  1736 Liveborn male infant 9/9 APGARs  Intact, QBL 164mls   hgb on admission 11.7  2110 142/67 * 1st elevated BP ( had 1 erroneous reading ar 1834)    Discharge summary pended

## 2024-01-10 NOTE — PROGRESS NOTES
Spiritual Care Visit    Clinical Encounter Type  Visited With: Patient  Routine Visit: Introduction  Continue Visiting: No            Patient declined  visits.

## 2024-01-10 NOTE — PROCEDURES
Myrtle Henriquezkettyalison, a  at 41w2d with an ELIS of 2023, by Ultrasound, was seen at [facility] for a nonstress test for postdates.    Non-Stress Test   Baseline Fetal Heart Rate for Non-Stress Test: 120 BPM  Variability in Waveform for Non-Stress Test: (!) Minimal (increased to moderate variability)  Accelerations in Non-Stress Test: Yes, greater than/equal to 15 bpm  Decelerations in Non-Stress Test: None  Contractions in Non-Stress Test: Irregular  Acoustic Stimulator for Non-Stress Test: Yes  Interpretation of Non-Stress Test   Interpretation of Non-Stress Test: Reactive    Elyssa Lam, CHEVY-LETY

## 2024-01-11 NOTE — NURSING NOTE
Written and verbal discharge instructions given, post birth warning signs reviewed with pt and fob, both verbalizes underswtanding and importance of follow up care fore self and baby

## 2024-01-13 NOTE — DISCHARGE SUMMARY
Discharge Summary    Admission Date: 1/9/2024  Discharge Date: 1/10/24    Discharge Diagnosis  Labor without complication    Hospital Course  Delivery Date: 1/9/2024  5:36 PM   Delivery type: Vaginal, Spontaneous    GA at delivery: 41w2d  Outcome: Living   Anesthesia during delivery: None   Intrapartum complications: None   Feeding method: Breastfeeding Status: Yes     Procedures: none  Contraception at discharge: none      Pertinent Physical Exam At Time of Discharge  See progress note    Discharge Meds     Your medication list        START taking these medications        Instructions Last Dose Given Next Dose Due   acetaminophen 500 mg tablet  Commonly known as: Tylenol Extra Strength      Take 2 tablets (1,000 mg) by mouth every 6 hours if needed for mild pain (1 - 3).       docusate sodium 100 mg capsule  Commonly known as: Colace      Take 1 capsule (100 mg) by mouth 2 times a day as needed for constipation.       ibuprofen 600 mg tablet      Take 1 tablet (600 mg) by mouth every 6 hours if needed for mild pain (1 - 3) or moderate pain (4 - 6).              CONTINUE taking these medications        Instructions Last Dose Given Next Dose Due   Prenatal Plus DHA 18 mg iron-800 mcg-290 mg combo pack, capsule and packet                     Where to Get Your Medications        These medications were sent to [id number] - [facility] [address]  [address] [address]      Phone: [phone]   ibuprofen 600 mg tablet       You can get these medications from any pharmacy    You don't need a prescription for these medications  acetaminophen 500 mg tablet  docusate sodium 100 mg capsule          Complications Requiring Follow-Up  None    Test Results Pending At Discharge  Pending Labs       No current pending labs.            Outpatient Follow-Up  No future appointments.        MIGUEL ÁNGEL Steen

## 2025-01-29 ENCOUNTER — APPOINTMENT (OUTPATIENT)
Dept: OBSTETRICS AND GYNECOLOGY | Facility: CLINIC | Age: 25
End: 2025-01-29
Payer: MEDICAID

## 2025-01-29 ENCOUNTER — LAB (OUTPATIENT)
Dept: LAB | Facility: HOSPITAL | Age: 25
End: 2025-01-29
Payer: MEDICAID

## 2025-01-29 VITALS — SYSTOLIC BLOOD PRESSURE: 125 MMHG | DIASTOLIC BLOOD PRESSURE: 80 MMHG | BODY MASS INDEX: 26.18 KG/M2 | WEIGHT: 162.2 LBS

## 2025-01-29 DIAGNOSIS — Z3A.09 9 WEEKS GESTATION OF PREGNANCY (HHS-HCC): Primary | ICD-10-CM

## 2025-01-29 DIAGNOSIS — Z34.80 NORMAL PREGNANCY IN MULTIGRAVIDA (HHS-HCC): ICD-10-CM

## 2025-01-29 PROBLEM — L71.9 ROSACEA: Status: ACTIVE | Noted: 2025-01-29

## 2025-01-29 PROBLEM — E55.9 VITAMIN D DEFICIENCY: Status: ACTIVE | Noted: 2025-01-29

## 2025-01-29 PROBLEM — J30.2 SEASONAL ALLERGIES: Status: ACTIVE | Noted: 2020-06-10

## 2025-01-29 PROBLEM — E73.9 LACTOSE INTOLERANCE IN ADULT: Status: ACTIVE | Noted: 2025-01-29

## 2025-01-29 LAB
ERYTHROCYTE [DISTWIDTH] IN BLOOD BY AUTOMATED COUNT: 13 % (ref 11.5–14.5)
HCT VFR BLD AUTO: 41.8 % (ref 36–46)
HGB BLD-MCNC: 13.6 G/DL (ref 12–16)
MCH RBC QN AUTO: 28.3 PG (ref 26–34)
MCHC RBC AUTO-ENTMCNC: 32.5 G/DL (ref 32–36)
MCV RBC AUTO: 87 FL (ref 80–100)
NRBC BLD-RTO: 0 /100 WBCS (ref 0–0)
PLATELET # BLD AUTO: 238 X10*3/UL (ref 150–450)
PREGNANCY TEST URINE, POC: POSITIVE
RBC # BLD AUTO: 4.81 X10*6/UL (ref 4–5.2)
WBC # BLD AUTO: 8 X10*3/UL (ref 4.4–11.3)

## 2025-01-29 PROCEDURE — 99213 OFFICE O/P EST LOW 20 MIN: CPT | Performed by: ADVANCED PRACTICE MIDWIFE

## 2025-01-29 PROCEDURE — 86901 BLOOD TYPING SEROLOGIC RH(D): CPT | Mod: OUT | Performed by: ADVANCED PRACTICE MIDWIFE

## 2025-01-29 PROCEDURE — H1000 PRENATAL CARE ATRISK ASSESSM: HCPCS | Performed by: ADVANCED PRACTICE MIDWIFE

## 2025-01-29 PROCEDURE — 85027 COMPLETE CBC AUTOMATED: CPT

## 2025-01-29 PROCEDURE — 81025 URINE PREGNANCY TEST: CPT | Performed by: ADVANCED PRACTICE MIDWIFE

## 2025-01-29 RX ORDER — PSYLLIUM HUSK 0.4 G
1 CAPSULE ORAL DAILY
COMMUNITY

## 2025-01-29 RX ORDER — VITAMIN B COMPLEX
1 CAPSULE ORAL DAILY
COMMUNITY

## 2025-01-29 ASSESSMENT — EDINBURGH POSTNATAL DEPRESSION SCALE (EPDS)
I HAVE LOOKED FORWARD WITH ENJOYMENT TO THINGS: AS MUCH AS I EVER DID
I HAVE BEEN ANXIOUS OR WORRIED FOR NO GOOD REASON: YES, SOMETIMES
I HAVE BEEN SO UNHAPPY THAT I HAVE BEEN CRYING: NO, NEVER
I HAVE FELT SCARED OR PANICKY FOR NO GOOD REASON: NO, NOT AT ALL
TOTAL SCORE: 5
I HAVE BEEN SO UNHAPPY THAT I HAVE HAD DIFFICULTY SLEEPING: NOT AT ALL
I HAVE FELT SAD OR MISERABLE: NOT VERY OFTEN
THE THOUGHT OF HARMING MYSELF HAS OCCURRED TO ME: NEVER
I HAVE BEEN ABLE TO LAUGH AND SEE THE FUNNY SIDE OF THINGS: AS MUCH AS I ALWAYS COULD
THINGS HAVE BEEN GETTING ON TOP OF ME: NO, MOST OF THE TIME I HAVE COPED QUITE WELL
I HAVE BLAMED MYSELF UNNECESSARILY WHEN THINGS WENT WRONG: NOT VERY OFTEN

## 2025-01-29 NOTE — PROGRESS NOTES
Patient presents for NOB visit.     This was a planned pregnancy, and patient is feeling excited  Complaints: dizziness    Work: home  Feels safe at home: yes    1. Routine PNC, patient appropriate for midwifery service. Patient oriented to practice, including available collaboration and consulting with physicians.   Prenatal navigator findings reviewed.  Discussed and ordered routine OB labs including serum STI/HIV, CBC and blood type and screen.  Pap: 2021 NILM  LMP: one regular period, done nursing. 11/23/25 certain. +FCA by BSUS (transabdominal)  Education provided r/t nutrition, folic acid supplementation, dietary guidelines, exercise, smoking, alcohol, caffeine and drug use. Healthy weight gain in pregnancy discussed.  BMI Body mass index is 26.18 kg/m².     2. Pregnancy Hx  Reviewed and significant for:   Late term (41 weeks) SVBs x3 without HTN, GDM, PPH    3. Genetic screening:  Chromosomal anomaly screening discussed/offered and patient declined.   Included in counseling that only diagnostic testing is CVS or amniocentesis.    4. Preeclampsia risk:  ASA prophylaxis: patient has preeclampsia risk factors including none  Recommendation will be made to start 162mg ASA daily at 12-16 weeks.    5. Vaccination in early pregnancy:  Recommendation for Influenza and COVID vaccine discussed. Patient aware of increased risk of disease and demonstrated safety of vaccination during pregnancy.  Pt declined flu vaccine.  Pt declined COVID vaccine.    6. Medical History Significant for:  None    Warning s/s discussed and SAB precautions reviewed.   RTC 4wks/prn    Next visit PE with PAP

## 2025-01-29 NOTE — PROGRESS NOTES
Patient presents for NOB visit with ***.     This was an *** pregnancy, and patient is feeling ***  Complaints: ***    Work: ***  Feels safe at home: ***    PE completed and documented in exam tab.     1. Routine PNC, patient appropriate for midwifery service. Patient oriented to practice, including available collaboration and consulting with physicians.   Prenatal navigator findings reviewed.  Discussed and ordered routine OB labs including serum STI/HIV, CBC and blood type and screen.  Pap: @PAP@  LMP: ***.  Education provided r/t nutrition, folic acid supplementation, dietary guidelines, exercise, smoking, alcohol, caffeine and drug use. Healthy weight gain in pregnancy discussed.    2. Pregnancy Hx  Reviewed and significant for: ***    3. Obesity in pregnancy:   BMI There is no height or weight on file to calculate BMI. Hemoglobin A1C ordered. Limiting weight gain to 11-20lbs through healthy eating habits and exercise reviewed.   surveillance for BMI 35-39.9 is Ultrasound at 30 & 36 weeks. BPP or NST weekly 37 wks to delivery.   surveillance for BMI 40+ is Ultrasound at 30 & 36 weeks. BPP or NST weekly 34wks to delivery.  For BMI 35+ Recommendation for delivery [id number].    4. Genetic screening:  Genetic carrier screening discussed/offered and patient {Accepted/Declined:64454}.   Chromosomal anomaly screening discussed/offered and patient {Accepted/Declined:84345}.   Included in counseling that only diagnostic testing is CVS or amniocentesis.    5. Preeclampsia risk:  ASA prophylaxis: patient has preeclampsia risk factors including ***  Recommendation will be made to start 162mg ASA daily at 12-16 weeks.    6. Vaccination in early pregnancy:  Recommendation for Influenza and COVID vaccine discussed. Patient aware of increased risk of disease and demonstrated safety of vaccination during pregnancy.  Pt {Accepted/Declined:42919} flu vaccine.  Pt {Accepted/Declined:91202} COVID vaccine.      4.  Medical History Significant for:  ***    Warning s/s discussed and SAB precautions reviewed.   RTC 4wks/prn

## 2025-01-30 ENCOUNTER — LAB REQUISITION (OUTPATIENT)
Dept: LAB | Facility: HOSPITAL | Age: 25
End: 2025-01-30
Payer: MEDICAID

## 2025-01-30 DIAGNOSIS — Z3A.09 9 WEEKS GESTATION OF PREGNANCY (HHS-HCC): ICD-10-CM

## 2025-01-30 LAB
ABO GROUP (TYPE) IN BLOOD: NORMAL
ANTIBODY SCREEN: NORMAL
REFLEX ADDED, ANEMIA PANEL: NORMAL
RH FACTOR (ANTIGEN D): NORMAL

## 2025-02-02 PROBLEM — Z3A.09 9 WEEKS GESTATION OF PREGNANCY (HHS-HCC): Status: ACTIVE | Noted: 2025-02-02

## 2025-02-02 PROBLEM — Z34.80 NORMAL PREGNANCY IN MULTIGRAVIDA (HHS-HCC): Status: ACTIVE | Noted: 2025-02-02

## 2025-02-02 LAB
BACTERIA UR CULT: ABNORMAL
C TRACH RRNA SPEC QL NAA+PROBE: NORMAL
EST. AVERAGE GLUCOSE BLD GHB EST-MCNC: 108 MG/DL
EST. AVERAGE GLUCOSE BLD GHB EST-SCNC: 6 MMOL/L
HBA1C MFR BLD: 5.4 % OF TOTAL HGB
HBV SURFACE AG SERPL QL IA: NORMAL
HCV AB SERPL QL IA: NORMAL
HIV 1+2 AB+HIV1 P24 AG SERPL QL IA: NORMAL
RUBV IGG SERPL IA-ACNC: 8.24 INDEX
T PALLIDUM AB SER QL IA: NEGATIVE
T VAGINALIS RRNA SPEC QL NAA+PROBE: NORMAL

## 2025-02-03 DIAGNOSIS — B95.1 GROUP B STREPTOCOCCUS URINARY TRACT INFECTION AFFECTING PREGNANCY IN FIRST TRIMESTER (HHS-HCC): Primary | ICD-10-CM

## 2025-02-03 DIAGNOSIS — O23.41 GROUP B STREPTOCOCCUS URINARY TRACT INFECTION AFFECTING PREGNANCY IN FIRST TRIMESTER (HHS-HCC): Primary | ICD-10-CM

## 2025-03-20 ENCOUNTER — APPOINTMENT (OUTPATIENT)
Facility: CLINIC | Age: 25
End: 2025-03-20
Payer: MEDICAID

## 2025-03-24 ENCOUNTER — APPOINTMENT (OUTPATIENT)
Dept: OBSTETRICS AND GYNECOLOGY | Facility: CLINIC | Age: 25
End: 2025-03-24
Payer: MEDICAID

## 2025-03-24 VITALS — SYSTOLIC BLOOD PRESSURE: 116 MMHG | WEIGHT: 164.8 LBS | DIASTOLIC BLOOD PRESSURE: 75 MMHG | BODY MASS INDEX: 26.6 KG/M2

## 2025-03-24 DIAGNOSIS — N89.8 VAGINAL DISCHARGE: ICD-10-CM

## 2025-03-24 DIAGNOSIS — Z34.80 NORMAL PREGNANCY IN MULTIGRAVIDA (HHS-HCC): ICD-10-CM

## 2025-03-24 DIAGNOSIS — Z3A.17 17 WEEKS GESTATION OF PREGNANCY (HHS-HCC): Primary | ICD-10-CM

## 2025-03-24 DIAGNOSIS — B95.1 GROUP B STREPTOCOCCUS URINARY TRACT INFECTION AFFECTING PREGNANCY IN FIRST TRIMESTER (HHS-HCC): ICD-10-CM

## 2025-03-24 DIAGNOSIS — O23.41 GROUP B STREPTOCOCCUS URINARY TRACT INFECTION AFFECTING PREGNANCY IN FIRST TRIMESTER (HHS-HCC): ICD-10-CM

## 2025-03-24 PROCEDURE — 99213 OFFICE O/P EST LOW 20 MIN: CPT | Performed by: ADVANCED PRACTICE MIDWIFE

## 2025-03-24 NOTE — PROGRESS NOTES
Ob Visit  23     SUBJECTIVE    HPI: Myrtle Colon is a 24 y.o.  at 17w2d here for Return OB visit.  Myrtle reports feeling well, no OB concerns. Reports itching and discharge, declines culture as she is sure it is a yeast infection, advised OTC Monistat.  Discussed pap, pt declines, agrees to do PP  Reviewed OB labs, WNLs except + GBS in urine, will need treatment in labor.  Anatomy U/S scheduled, encouraged to schedule. May find out gender at anatomy scan.        OBJECTIVE  Visit Vitals  /75   Wt 74.8 kg (164 lb 12.8 oz)   LMP 2024 (Exact Date)   BMI 26.60 kg/m²   OB Status Pregnant   Smoking Status Never   BSA 1.87 m²            ASSESSMENT & PLAN    Myrtle Colon is a 24 y.o.  at 17w2d here for the following concerns we addressed today:    Problem List Items Addressed This Visit          Ob-Gyn Problems    Normal pregnancy in multigravida ([facility])    Relevant Orders    US MAC OB imaging order    Group B Streptococcus urinary tract infection affecting pregnancy in first trimester ([facility])    Overview     Treat in labor         17 weeks gestation of pregnancy ([facility]) - Primary    Overview     Desired provider in labor: [x] CNM  [] Physician   [] Either Acceptable  [x] Blood Products: [x] Yes, accepts [] No, needs counseling  [x] Initial BMI: 25.84   [x] Prenatal Labs: WNLs  [x] Cervical Cancer Screening up to date - Collect at 6 wk PPV  [x] Rh status: POS  [x] Screen for IPV and Substance Use Risk:  [x] Genetic Screening (cfDNA):  Declines  [x] First Trimester Anatomy Screen (11-13.6 wks): Declined  [] Baby ASA (initiated):  [x] Pregnancy dated by: LMP    [] Anatomy US: (19-20 wks)  [] Federal Sterilization consent signed (if indicated):  [] 1hr GCT at 24-28wks:  [] Fetal Surveillance (if indicated):  [] Tdap (27-32 wks, may be given up to 36 wks if initial window missed):     [] Feeding Intentions:  [] Postpartum Birth control method:   [] GBS at 36 - 37  wks:  [] 39 weeks discussion of IOL vs. Expectant management:  [] Mode of delivery ( anticipated ):            Other Visit Diagnoses       Vaginal discharge                  Return to care in 4 weeks or sooner as needed      MIGUEL ÁNGEL Steen

## 2025-04-09 ENCOUNTER — HOSPITAL ENCOUNTER (OUTPATIENT)
Dept: RADIOLOGY | Facility: CLINIC | Age: 25
Discharge: HOME | End: 2025-04-09
Payer: MEDICAID

## 2025-04-09 DIAGNOSIS — Z3A.09 9 WEEKS GESTATION OF PREGNANCY (HHS-HCC): ICD-10-CM

## 2025-04-09 PROCEDURE — 76811 OB US DETAILED SNGL FETUS: CPT

## 2025-04-21 ENCOUNTER — APPOINTMENT (OUTPATIENT)
Dept: OBSTETRICS AND GYNECOLOGY | Facility: CLINIC | Age: 25
End: 2025-04-21
Payer: MEDICAID

## 2025-04-23 ENCOUNTER — APPOINTMENT (OUTPATIENT)
Dept: RADIOLOGY | Facility: CLINIC | Age: 25
End: 2025-04-23
Payer: MEDICAID

## 2025-05-22 ENCOUNTER — HOSPITAL ENCOUNTER (OUTPATIENT)
Dept: RADIOLOGY | Facility: CLINIC | Age: 25
Discharge: HOME | End: 2025-05-22

## 2025-05-22 DIAGNOSIS — Z34.80 NORMAL PREGNANCY IN MULTIGRAVIDA (HHS-HCC): ICD-10-CM

## 2025-05-22 PROCEDURE — 76816 OB US FOLLOW-UP PER FETUS: CPT

## 2025-05-23 DIAGNOSIS — Z34.81 PRENATAL CARE, SUBSEQUENT PREGNANCY IN FIRST TRIMESTER: ICD-10-CM

## 2025-05-23 DIAGNOSIS — Z3A.25 25 WEEKS GESTATION OF PREGNANCY (HHS-HCC): ICD-10-CM

## 2025-05-23 DIAGNOSIS — Z34.90 PREGNANCY, UNSPECIFIED GESTATIONAL AGE (HHS-HCC): Primary | ICD-10-CM

## 2025-05-29 ENCOUNTER — APPOINTMENT (OUTPATIENT)
Facility: CLINIC | Age: 25
End: 2025-05-29

## 2025-06-09 ENCOUNTER — APPOINTMENT (OUTPATIENT)
Facility: CLINIC | Age: 25
End: 2025-06-09

## 2025-06-09 VITALS — SYSTOLIC BLOOD PRESSURE: 125 MMHG | DIASTOLIC BLOOD PRESSURE: 80 MMHG | WEIGHT: 181.6 LBS | BODY MASS INDEX: 29.31 KG/M2

## 2025-06-09 DIAGNOSIS — Z34.80 NORMAL PREGNANCY IN MULTIGRAVIDA (HHS-HCC): ICD-10-CM

## 2025-06-09 DIAGNOSIS — O23.41 GROUP B STREPTOCOCCUS URINARY TRACT INFECTION AFFECTING PREGNANCY IN FIRST TRIMESTER (HHS-HCC): ICD-10-CM

## 2025-06-09 DIAGNOSIS — Z71.85 VACCINE COUNSELING: ICD-10-CM

## 2025-06-09 DIAGNOSIS — B95.1 GROUP B STREPTOCOCCUS URINARY TRACT INFECTION AFFECTING PREGNANCY IN FIRST TRIMESTER (HHS-HCC): ICD-10-CM

## 2025-06-09 DIAGNOSIS — Z23 NEED FOR TDAP VACCINATION: Primary | ICD-10-CM

## 2025-06-09 DIAGNOSIS — Z34.83 PRENATAL CARE, SUBSEQUENT PREGNANCY IN THIRD TRIMESTER: ICD-10-CM

## 2025-06-09 DIAGNOSIS — Z3A.28 28 WEEKS GESTATION OF PREGNANCY (HHS-HCC): ICD-10-CM

## 2025-06-09 PROCEDURE — 0501F PRENATAL FLOW SHEET: CPT

## 2025-06-09 ASSESSMENT — EDINBURGH POSTNATAL DEPRESSION SCALE (EPDS)
I HAVE FELT SCARED OR PANICKY FOR NO GOOD REASON: NO, NOT AT ALL
THE THOUGHT OF HARMING MYSELF HAS OCCURRED TO ME: NEVER
I HAVE BEEN SO UNHAPPY THAT I HAVE BEEN CRYING: NO, NEVER
THINGS HAVE BEEN GETTING ON TOP OF ME: NO, I HAVE BEEN COPING AS WELL AS EVER
I HAVE BEEN ANXIOUS OR WORRIED FOR NO GOOD REASON: NO, NOT AT ALL
TOTAL SCORE: 0
I HAVE BLAMED MYSELF UNNECESSARILY WHEN THINGS WENT WRONG: NO, NEVER
I HAVE LOOKED FORWARD WITH ENJOYMENT TO THINGS: AS MUCH AS I EVER DID
I HAVE BEEN ABLE TO LAUGH AND SEE THE FUNNY SIDE OF THINGS: AS MUCH AS I ALWAYS COULD
I HAVE BEEN SO UNHAPPY THAT I HAVE HAD DIFFICULTY SLEEPING: NOT AT ALL
I HAVE FELT SAD OR MISERABLE: NO, NOT AT ALL

## 2025-06-09 NOTE — PROGRESS NOTES
Assessment/Plan   24 y.o.  at 28w2d  - Counseled on and recommended Tdap today, pt declines  - Encouraged completion of outstanding orders for GCT/CBC as soon as possible, especially given her concern for possible anemia. Pt will try to complete soon. Reviewed iron-rich sources of food including meat, seafood, beans, dark green leafy vegetables, dried fruit, iron-fortified cereals, breads and pastas, and peas.  - GC/CT/Trich testing collected today, as the screening was not ran with NOB labs  - Discussed kick counts  - Encouraged pt to bring her kids to her prenatal visits if she is unable to find childcare, we love kids!  - Routine prenatal care    Follow up in 2 weeks for next prenatal visit    MIGUEL ÁNGEL Echeverria    Ayah Colon is a 24 y.o.  at 28w2d with a working estimated date of delivery of 2025, by Last Menstrual Period presenting for a routine prenatal visit. She is doing well, no concerns. She denies vaginal bleeding, leakage of fluid, or regular contractions. She endorses good FM.    A little dizzy today, wonders if she is anemic. Also craving red meat.   She is trying to meet all of the midwives prior to giving birth.   Has difficulty finding childcare, which is why she has had a gap in prenatal care    Her pregnancy is complicated by:  Pregnancy Problems (from 25 to present)       Problem Noted Diagnosed Resolved    Group B Streptococcus urinary tract infection affecting pregnancy in first trimester ([facility]) 2/3/2025 by MIGUEL ÁNGEL Chin  No    Priority:  Medium       Overview Signed 3/24/2025 10:13 AM by MIGUEL ÁNGEL Steen   Treat in labor         28 weeks gestation of pregnancy ([facility]) 2025 by MIGUEL ÁNGEL Chin  No    Priority:  Medium       Overview Addendum 2025 10:56 AM by MIGUEL ÁNGEL Steen   Desired provider in labor: [x] CNM  [] Physician   [] Either Acceptable  [x] Blood Products: [x] Yes,  accepts [] No, needs counseling  [x] Initial BMI: 25.84   [x] Prenatal Labs: WNLs  [x] Cervical Cancer Screening up to date - Collect at 6 wk PPV  [x] Rh status: POS  [x] Screen for IPV and Substance Use Risk:  [x] Genetic Screening (cfDNA):  Declines  [x] First Trimester Anatomy Screen (11-13.6 wks): Declined  [] Baby ASA (initiated):  [x] Pregnancy dated by: LMP    [x] Anatomy US: (19-20 wks) Incomplete, fetal finding isolated CPCs recommends genetic counseling and NIPS - pt declines  Follow up U/S complete, WNLs  [] Federal Sterilization consent signed (if indicated):  [] 1hr GCT at 24-28wks:  [] Fetal Surveillance (if indicated):  [] Tdap (27-32 wks, may be given up to 36 wks if initial window missed):     [] Feeding Intentions:  [] Postpartum Birth control method:   [] GBS at 36 - 37 wks:  [] 39 weeks discussion of IOL vs. Expectant management:  [] Mode of delivery ( anticipated ):           Normal pregnancy in multigravida ([facility]) 2025 by CHEVY Chin-LETY  No    Priority:  Medium                Objective   Weight: 82.4 kg (181 lb 9.6 oz)  TW.798 kg (21 lb 9.6 oz)   Expected Total Weight Gain: 7 kg (15 lb)-11.5 kg (25 lb)   Pregravid BMI: 25.84  Pregravid Weight: 72.6 kg (160 lb)   BP: 125/80  Fetal Heart Rate: 155 Fundal Height (cm): 28 cm

## 2025-06-10 LAB
C TRACH RRNA SPEC QL NAA+PROBE: NOT DETECTED
N GONORRHOEA RRNA SPEC QL NAA+PROBE: NOT DETECTED
QUEST GC CT AMPLIFIED (ALWAYS MESSAGE): NORMAL
T VAGINALIS RRNA SPEC QL NAA+PROBE: NOT DETECTED

## 2025-07-07 ENCOUNTER — APPOINTMENT (OUTPATIENT)
Facility: CLINIC | Age: 25
End: 2025-07-07

## 2025-07-07 VITALS — BODY MASS INDEX: 29.8 KG/M2 | DIASTOLIC BLOOD PRESSURE: 81 MMHG | WEIGHT: 184.6 LBS | SYSTOLIC BLOOD PRESSURE: 123 MMHG

## 2025-07-07 DIAGNOSIS — Z34.83 PRENATAL CARE, SUBSEQUENT PREGNANCY IN THIRD TRIMESTER: ICD-10-CM

## 2025-07-07 DIAGNOSIS — Z34.80 NORMAL PREGNANCY IN MULTIGRAVIDA (HHS-HCC): ICD-10-CM

## 2025-07-07 DIAGNOSIS — O23.41 GROUP B STREPTOCOCCUS URINARY TRACT INFECTION AFFECTING PREGNANCY IN FIRST TRIMESTER (HHS-HCC): ICD-10-CM

## 2025-07-07 DIAGNOSIS — B95.1 GROUP B STREPTOCOCCUS URINARY TRACT INFECTION AFFECTING PREGNANCY IN FIRST TRIMESTER (HHS-HCC): ICD-10-CM

## 2025-07-07 DIAGNOSIS — Z3A.32 32 WEEKS GESTATION OF PREGNANCY (HHS-HCC): Primary | ICD-10-CM

## 2025-07-07 PROCEDURE — 0501F PRENATAL FLOW SHEET: CPT

## 2025-07-07 NOTE — PROGRESS NOTES
Assessment/Plan   24 y.o.  at 32w2d  - Again encouraged pt to complete 1 hour GCT, advised her that failure to do so by 34 weeks would risk her out of midwifery care. Pt verbalizes understanding and plans to complete this week.   - Discussed plans for  feeding and reviewed benefits of breastfeeding; Pt plans to breastfeed. Encouraged her to request prescription for breast pump through [employer] or [employer].   - Routine prenatal care    Follow up in 2 weeks for next prenatal visit    MIGUEL ÁNGEL Echeverria    Ayah Colon is a 24 y.o.  at 32w2d with a working estimated date of delivery of 2025, by Last Menstrual Period presenting for a routine prenatal visit. She is doing well, no concerns. She denies vaginal bleeding, leakage of fluid, or regular contractions. She endorses good FM.    Starting to get [place].     Her pregnancy is complicated by:  Pregnancy Problems (from 25 to present)       Problem Noted Diagnosed Resolved    Group B Streptococcus urinary tract infection affecting pregnancy in first trimester ([facility]) 2/3/2025 by MIGUEL ÁNGEL Chin  No    Priority:  Medium       Overview Signed 3/24/2025 10:13 AM by MIGUEL ÁNGEL Steen   Treat in labor         32 weeks gestation of pregnancy ([facility]) 2025 by MIGUEL ÁNGEL Chin  No    Priority:  Medium       Overview Addendum 2025  4:37 PM by MIGUEL ÁNGEL Echeverria   Desired provider in labor: [x] CNM  [] Physician   [] Either Acceptable  [x] Blood Products: [x] Yes, accepts [] No, needs counseling  [x] Initial BMI: 25.84   [x] Prenatal Labs: WNLs  [x] Cervical Cancer Screening up to date - Collect at 6 wk PPV  [x] Rh status: POS  [x] Screen for IPV and Substance Use Risk:  [x] Genetic Screening (cfDNA):  Declines  [x] First Trimester Anatomy Screen (11-13.6 wks): Declined  [] Baby ASA (initiated):  [x] Pregnancy dated by: LMP    [x] Anatomy US: (19-20 wks)  Incomplete, fetal finding isolated CPCs recommends genetic counseling and NIPS - pt declines  Follow up U/S complete, WNLs  [] Federal Sterilization consent signed (if indicated):  [] 1hr GCT at 24-28wks:  [] Fetal Surveillance (if indicated):  [x] Tdap (27-32 wks, may be given up to 36 wks if initial window missed): declines 25    [] Feeding Intentions:  [] Postpartum Birth control method:   [] GBS at 36 - 37 wks:  [] 39 weeks discussion of IOL vs. Expectant management:  [x] Mode of delivery ( anticipated ): Vaginal, Desires NCB           Normal pregnancy in multigravida ([facility]) 2025 by CHEVY Chin-LETY  No    Priority:  Medium                Objective   Weight: 83.7 kg (184 lb 9.6 oz)  TW.2 kg (24 lb 9.6 oz)   Expected Total Weight Gain: 7 kg (15 lb)-11.5 kg (25 lb)   Pregravid BMI: 25.84  Pregravid Weight: 72.6 kg (160 lb)   BP: 123/81  Fetal Heart Rate: 126 Fundal Height (cm): 32 cm Presentation: Vertex

## 2025-07-10 LAB — GLUCOSE 1H P 50 G GLC PO SERPL-MCNC: 86 MG/DL

## 2025-07-24 ENCOUNTER — APPOINTMENT (OUTPATIENT)
Facility: CLINIC | Age: 25
End: 2025-07-24

## 2025-08-01 ENCOUNTER — TELEPHONE (OUTPATIENT)
Dept: OBSTETRICS AND GYNECOLOGY | Facility: CLINIC | Age: 25
End: 2025-08-01
Payer: COMMERCIAL

## 2025-08-01 NOTE — TELEPHONE ENCOUNTER
Office received a faxed order for breast pump.  It was signed by [facility] today and faxed to [facility] [place] at [phone]

## 2025-08-07 ENCOUNTER — APPOINTMENT (OUTPATIENT)
Dept: OBSTETRICS AND GYNECOLOGY | Facility: CLINIC | Age: 25
End: 2025-08-07

## 2025-08-14 ENCOUNTER — APPOINTMENT (OUTPATIENT)
Dept: OBSTETRICS AND GYNECOLOGY | Facility: CLINIC | Age: 25
End: 2025-08-14
Payer: MEDICAID

## 2025-08-14 VITALS — DIASTOLIC BLOOD PRESSURE: 80 MMHG | BODY MASS INDEX: 31.31 KG/M2 | SYSTOLIC BLOOD PRESSURE: 137 MMHG | WEIGHT: 194 LBS

## 2025-08-14 DIAGNOSIS — Z3A.37 37 WEEKS GESTATION OF PREGNANCY (HHS-HCC): Primary | ICD-10-CM

## 2025-08-14 PROCEDURE — 99213 OFFICE O/P EST LOW 20 MIN: CPT

## 2025-08-21 ENCOUNTER — APPOINTMENT (OUTPATIENT)
Dept: OBSTETRICS AND GYNECOLOGY | Facility: CLINIC | Age: 25
End: 2025-08-21
Payer: COMMERCIAL

## 2025-08-21 DIAGNOSIS — Z3A.38 38 WEEKS GESTATION OF PREGNANCY (HHS-HCC): Primary | ICD-10-CM

## 2025-08-21 PROCEDURE — 1036F TOBACCO NON-USER: CPT

## 2025-08-21 PROCEDURE — 99213 OFFICE O/P EST LOW 20 MIN: CPT

## 2025-08-25 ENCOUNTER — TELEPHONE (OUTPATIENT)
Dept: OBSTETRICS AND GYNECOLOGY | Facility: CLINIC | Age: 25
End: 2025-08-25
Payer: COMMERCIAL

## 2025-08-27 ENCOUNTER — APPOINTMENT (OUTPATIENT)
Dept: OBSTETRICS AND GYNECOLOGY | Facility: CLINIC | Age: 25
End: 2025-08-27
Payer: COMMERCIAL

## 2025-08-28 ENCOUNTER — APPOINTMENT (OUTPATIENT)
Dept: OBSTETRICS AND GYNECOLOGY | Facility: CLINIC | Age: 25
End: 2025-08-28
Payer: COMMERCIAL

## 2025-08-31 ENCOUNTER — HOSPITAL ENCOUNTER (INPATIENT)
Facility: HOSPITAL | Age: 25
End: 2025-08-31
Attending: OBSTETRICS & GYNECOLOGY | Admitting: MIDWIFE
Payer: COMMERCIAL

## 2025-08-31 PROBLEM — Z3A.38 38 WEEKS GESTATION OF PREGNANCY (HHS-HCC): Status: RESOLVED | Noted: 2025-02-02 | Resolved: 2025-08-31

## 2025-08-31 SDOH — SOCIAL STABILITY: SOCIAL INSECURITY
WITHIN THE LAST YEAR, HAVE YOU BEEN KICKED, HIT, SLAPPED, OR OTHERWISE PHYSICALLY HURT BY YOUR PARTNER OR EX-PARTNER?: NO

## 2025-08-31 SDOH — SOCIAL STABILITY: SOCIAL INSECURITY: VERBAL ABUSE: DENIES

## 2025-08-31 SDOH — SOCIAL STABILITY: SOCIAL INSECURITY: ARE THERE ANY APPARENT SIGNS OF INJURIES/BEHAVIORS THAT COULD BE RELATED TO ABUSE/NEGLECT?: NO

## 2025-08-31 SDOH — SOCIAL STABILITY: SOCIAL INSECURITY: WITHIN THE LAST YEAR, HAVE YOU BEEN HUMILIATED OR EMOTIONALLY ABUSED IN OTHER WAYS BY YOUR PARTNER OR EX-PARTNER?: NO

## 2025-08-31 SDOH — SOCIAL STABILITY: SOCIAL INSECURITY: WITHIN THE LAST YEAR, HAVE YOU BEEN AFRAID OF YOUR PARTNER OR EX-PARTNER?: NO

## 2025-08-31 SDOH — HEALTH STABILITY: MENTAL HEALTH: NON-SPECIFIC ACTIVE SUICIDAL THOUGHTS (PAST 1 MONTH): NO

## 2025-08-31 SDOH — HEALTH STABILITY: MENTAL HEALTH: SUICIDAL BEHAVIOR (LIFETIME): NO

## 2025-08-31 SDOH — ECONOMIC STABILITY: FOOD INSECURITY: WITHIN THE PAST 12 MONTHS, YOU WORRIED THAT YOUR FOOD WOULD RUN OUT BEFORE YOU GOT THE MONEY TO BUY MORE.: NEVER TRUE

## 2025-08-31 SDOH — HEALTH STABILITY: MENTAL HEALTH: WISH TO BE DEAD (PAST 1 MONTH): NO

## 2025-08-31 SDOH — SOCIAL STABILITY: SOCIAL INSECURITY: ABUSE SCREEN: ADULT

## 2025-08-31 SDOH — SOCIAL STABILITY: SOCIAL INSECURITY: PHYSICAL ABUSE: DENIES

## 2025-08-31 SDOH — ECONOMIC STABILITY: FOOD INSECURITY: WITHIN THE PAST 12 MONTHS, THE FOOD YOU BOUGHT JUST DIDN'T LAST AND YOU DIDN'T HAVE MONEY TO GET MORE.: NEVER TRUE

## 2025-08-31 SDOH — SOCIAL STABILITY: SOCIAL INSECURITY: ARE YOU OR HAVE YOU BEEN THREATENED OR ABUSED PHYSICALLY, EMOTIONALLY, OR SEXUALLY BY ANYONE?: NO

## 2025-08-31 SDOH — SOCIAL STABILITY: SOCIAL INSECURITY: HAVE YOU HAD ANY THOUGHTS OF HARMING ANYONE ELSE?: NO

## 2025-08-31 SDOH — SOCIAL STABILITY: SOCIAL INSECURITY: DO YOU FEEL ANYONE HAS EXPLOITED OR TAKEN ADVANTAGE OF YOU FINANCIALLY OR OF YOUR PERSONAL PROPERTY?: NO

## 2025-08-31 SDOH — SOCIAL STABILITY: SOCIAL INSECURITY
WITHIN THE LAST YEAR, HAVE YOU BEEN RAPED OR FORCED TO HAVE ANY KIND OF SEXUAL ACTIVITY BY YOUR PARTNER OR EX-PARTNER?: NO

## 2025-08-31 SDOH — SOCIAL STABILITY: SOCIAL INSECURITY: HAS ANYONE EVER THREATENED TO HURT YOUR FAMILY OR YOUR PETS?: NO

## 2025-08-31 SDOH — HEALTH STABILITY: MENTAL HEALTH: WERE YOU ABLE TO COMPLETE ALL THE BEHAVIORAL HEALTH SCREENINGS?: YES

## 2025-08-31 SDOH — ECONOMIC STABILITY: HOUSING INSECURITY: DO YOU FEEL UNSAFE GOING BACK TO THE PLACE WHERE YOU ARE LIVING?: NO

## 2025-08-31 SDOH — SOCIAL STABILITY: SOCIAL INSECURITY: HAVE YOU HAD THOUGHTS OF HARMING ANYONE ELSE?: NO

## 2025-08-31 SDOH — SOCIAL STABILITY: SOCIAL INSECURITY: DOES ANYONE TRY TO KEEP YOU FROM HAVING/CONTACTING OTHER FRIENDS OR DOING THINGS OUTSIDE YOUR HOME?: NO

## 2025-08-31 ASSESSMENT — LIFESTYLE VARIABLES
HOW OFTEN DO YOU HAVE 6 OR MORE DRINKS ON ONE OCCASION: NEVER
AUDIT-C TOTAL SCORE: 0
AUDIT-C TOTAL SCORE: 0
HOW MANY STANDARD DRINKS CONTAINING ALCOHOL DO YOU HAVE ON A TYPICAL DAY: PATIENT DOES NOT DRINK
SKIP TO QUESTIONS 9-10: 1
HOW OFTEN DO YOU HAVE A DRINK CONTAINING ALCOHOL: NEVER

## 2025-08-31 ASSESSMENT — PATIENT HEALTH QUESTIONNAIRE - PHQ9
2. FEELING DOWN, DEPRESSED OR HOPELESS: NOT AT ALL
1. LITTLE INTEREST OR PLEASURE IN DOING THINGS: NOT AT ALL
SUM OF ALL RESPONSES TO PHQ9 QUESTIONS 1 & 2: 0

## 2025-08-31 ASSESSMENT — PAIN SCALES - GENERAL
PAINLEVEL_OUTOF10: 5 - MODERATE PAIN
PAINLEVEL_OUTOF10: 5 - MODERATE PAIN
PAINLEVEL_OUTOF10: 10 - WORST POSSIBLE PAIN
PAINLEVEL_OUTOF10: 5 - MODERATE PAIN
PAINLEVEL_OUTOF10: 5 - MODERATE PAIN
PAINLEVEL_OUTOF10: 10 - WORST POSSIBLE PAIN
PAINLEVEL_OUTOF10: 5 - MODERATE PAIN
PAINLEVEL_OUTOF10: 5 - MODERATE PAIN

## 2025-08-31 ASSESSMENT — PAIN DESCRIPTION - DESCRIPTORS
DESCRIPTORS: CRAMPING

## 2025-08-31 ASSESSMENT — ACTIVITIES OF DAILY LIVING (ADL): LACK_OF_TRANSPORTATION: NO

## 2025-09-01 ASSESSMENT — PAIN SCALES - GENERAL
PAINLEVEL_OUTOF10: 0 - NO PAIN
PAINLEVEL_OUTOF10: 2
PAINLEVEL_OUTOF10: 6
PAINLEVEL_OUTOF10: 0 - NO PAIN

## 2025-09-01 ASSESSMENT — PAIN DESCRIPTION - DESCRIPTORS: DESCRIPTORS: CRAMPING

## 2025-09-03 ENCOUNTER — APPOINTMENT (OUTPATIENT)
Dept: OBSTETRICS AND GYNECOLOGY | Facility: CLINIC | Age: 25
End: 2025-09-03
Payer: COMMERCIAL

## 2025-09-04 ENCOUNTER — APPOINTMENT (OUTPATIENT)
Dept: OBSTETRICS AND GYNECOLOGY | Facility: CLINIC | Age: 25
End: 2025-09-04
Payer: COMMERCIAL

## 2025-09-04 ENCOUNTER — APPOINTMENT (OUTPATIENT)
Facility: CLINIC | Age: 25
End: 2025-09-04
Payer: COMMERCIAL